# Patient Record
Sex: FEMALE | Employment: STUDENT | URBAN - METROPOLITAN AREA
[De-identification: names, ages, dates, MRNs, and addresses within clinical notes are randomized per-mention and may not be internally consistent; named-entity substitution may affect disease eponyms.]

---

## 2017-01-03 NOTE — TELEPHONE ENCOUNTER
LOV 10/8/16. Per [facility] [place] protocol ok to refill x 6 months.

## 2017-01-09 ENCOUNTER — TELEPHONE (OUTPATIENT)
Dept: ENDOCRINOLOGY CLINIC | Facility: CLINIC | Age: 22
End: 2017-01-09

## 2017-01-09 NOTE — TELEPHONE ENCOUNTER
Pt needs new RX for Contour Easy Meter and also for test strips. Pt only has a few test strips left. Pt needs new meter due to insurance change. Please call when RX is sent to pharmacy.

## 2017-01-09 NOTE — TELEPHONE ENCOUNTER
LOV 10/8/16. Per [facility] [place] protocol ok to send refill for test strips and new meter. Sent to pharmacy and LM letting patient know Rx has been sent.

## 2017-03-17 ENCOUNTER — OFFICE VISIT (OUTPATIENT)
Dept: ENDOCRINOLOGY CLINIC | Facility: CLINIC | Age: 22
End: 2017-03-17

## 2017-03-17 VITALS
WEIGHT: 118 LBS | TEMPERATURE: 99 F | RESPIRATION RATE: 16 BRPM | HEART RATE: 109 BPM | BODY MASS INDEX: 23.16 KG/M2 | HEIGHT: 60 IN | DIASTOLIC BLOOD PRESSURE: 66 MMHG | SYSTOLIC BLOOD PRESSURE: 106 MMHG

## 2017-03-17 DIAGNOSIS — IMO0001 UNCONTROLLED TYPE 1 DIABETES MELLITUS WITHOUT COMPLICATION: Primary | ICD-10-CM

## 2017-03-17 LAB
CARTRIDGE LOT#: ABNORMAL NUMERIC
GLUCOSE BLOOD: 165
HEMOGLOBIN A1C: 7.2 % (ref 4.3–5.6)
TEST STRIP LOT #: NORMAL NUMERIC

## 2017-03-17 PROCEDURE — 99214 OFFICE O/P EST MOD 30 MIN: CPT | Performed by: INTERNAL MEDICINE

## 2017-03-17 PROCEDURE — 36416 COLLJ CAPILLARY BLOOD SPEC: CPT | Performed by: INTERNAL MEDICINE

## 2017-03-17 PROCEDURE — 83036 HEMOGLOBIN GLYCOSYLATED A1C: CPT | Performed by: INTERNAL MEDICINE

## 2017-03-17 PROCEDURE — 82962 GLUCOSE BLOOD TEST: CPT | Performed by: INTERNAL MEDICINE

## 2017-03-17 NOTE — PROGRESS NOTES
Name: Pita Pa  Date: 3/17/2017    Referring Physician: No ref. provider found    HISTORY OF PRESENT ILLNESS   Pita Pa is a 25year old female who presents for diabetes mellitus, diagnosed 8 years ago.   She has tried omnipod in the past Rfl: 3  •  Vitamin D, Ergocalciferol, (DRISDOL) 42657 UNITS Oral Cap, 50,000 Units every 30 (thirty) days.   , Disp: , Rfl: 3  •  LEVEMIR 100 UNIT/ML Subcutaneous Solution, 23 units in the morning and 21 units at night. , Disp: , Rfl: 0  •  APIDRA 100 UNIT/ intact  Psychiatric:  oriented to time, self, and place  Nutritional:  no abnormal weight gain or loss    ASSESSMENT/PLAN:      1.  Diabetes Mellitus Type 1, Uncontrolled  -Uncontrolled, hgA1c 7.2% -->improved  -Discussed importance of glycemic control to p

## 2017-04-10 ENCOUNTER — TELEPHONE (OUTPATIENT)
Dept: ENDOCRINOLOGY CLINIC | Facility: CLINIC | Age: 22
End: 2017-04-10

## 2017-06-23 ENCOUNTER — OFFICE VISIT (OUTPATIENT)
Dept: ENDOCRINOLOGY CLINIC | Facility: CLINIC | Age: 22
End: 2017-06-23

## 2017-06-23 VITALS
HEIGHT: 60 IN | HEART RATE: 99 BPM | SYSTOLIC BLOOD PRESSURE: 104 MMHG | WEIGHT: 118.19 LBS | DIASTOLIC BLOOD PRESSURE: 72 MMHG | BODY MASS INDEX: 23.2 KG/M2

## 2017-06-23 DIAGNOSIS — E10.65 UNCONTROLLED TYPE 1 DIABETES MELLITUS WITH HYPERGLYCEMIA (HCC): Primary | ICD-10-CM

## 2017-06-23 PROCEDURE — 36416 COLLJ CAPILLARY BLOOD SPEC: CPT | Performed by: INTERNAL MEDICINE

## 2017-06-23 PROCEDURE — 99213 OFFICE O/P EST LOW 20 MIN: CPT | Performed by: INTERNAL MEDICINE

## 2017-06-23 PROCEDURE — 83036 HEMOGLOBIN GLYCOSYLATED A1C: CPT | Performed by: INTERNAL MEDICINE

## 2017-06-23 PROCEDURE — 82962 GLUCOSE BLOOD TEST: CPT | Performed by: INTERNAL MEDICINE

## 2017-06-23 RX ORDER — INSULIN ASPART 100 [IU]/ML
INJECTION, SOLUTION INTRAVENOUS; SUBCUTANEOUS
Refills: 12 | COMMUNITY
Start: 2017-06-01 | End: 2018-12-19

## 2017-06-23 NOTE — PROGRESS NOTES
Name: Yuri Beltran  Date: 6/23/2017    Referring Physician: No ref. provider found    HISTORY OF PRESENT ILLNESS   Yuri Beltran is a 23year old female who presents for diabetes mellitus, diagnosed 8 years ago.   She has tried omnipod in the past once daily, Disp: 100 each, Rfl: 3  •  LEVEMIR 100 UNIT/ML Subcutaneous Solution, 21 units in the morning and 21 units at night. , Disp: , Rfl: 0  •  Insulin Syringe 31G X 5/16\" 0.3 ML Does not apply Misc, , Disp: , Rfl: 0  •  Norethindrone-Eth Estradiol strength and tone  Skin:  normal moisture and skin texture  Hair & Nails:  normal scalp hair     Hematologic:  no excessive bruising  Neuro:  sensory grossly intact and motor grossly intact  Psychiatric:  oriented to time, self, and place  Nutritional:  no

## 2017-06-23 NOTE — PATIENT INSTRUCTIONS
Stop Levemir    Start Tresiba 28 units SQ bedtime     Call clinic in 2 weeks with blood glucose levels to adjust dose

## 2017-06-30 ENCOUNTER — TELEPHONE (OUTPATIENT)
Dept: ENDOCRINOLOGY CLINIC | Facility: CLINIC | Age: 22
End: 2017-06-30

## 2017-06-30 NOTE — TELEPHONE ENCOUNTER
We did receive request for refill on pen needles this morning. SH signed and they were sent to [facility] in [facility]. LMTCB on number provided below letting her know.  Informed her she should contact office if unable to  or needs anything bong

## 2017-06-30 NOTE — TELEPHONE ENCOUNTER
Pt called regarding RX: Lov, pt received Pen and would like to know if she should have received needles too? Pls call back at: [phone], thank you.     Current Outpatient Prescriptions:   •  Insulin Pen Needle (BD PEN NEEDLE HAO U/F) 32G X 4 MM Does no

## 2017-06-30 NOTE — TELEPHONE ENCOUNTER
Current Outpatient Prescriptions:  BESOS CONTOUR NEXT TEST In Vitro Strip USE TO CHECK BLOOD SUGAR FIVE TIMES DAILY AS DIRECTED Disp: 150 strip Rfl: 5   NOVOLOG 100 UNIT/ML Subcutaneous Solution Sliding scale Disp:  Rfl: 12   Insulin Degludec (TRESIB

## 2017-06-30 NOTE — TELEPHONE ENCOUNTER
Pt also wanted me to include a 2nd ph number, if she could not be reached at the 1st number. Pls try [phone], ok to leave detailed message, thanks.

## 2017-08-16 ENCOUNTER — TELEPHONE (OUTPATIENT)
Dept: ENDOCRINOLOGY CLINIC | Facility: CLINIC | Age: 22
End: 2017-08-16

## 2017-08-16 NOTE — TELEPHONE ENCOUNTER
Called Riya and let her know below changes. She will contact office if Hypoglycemia continues. She will think about possible CGM but not interested at this time.

## 2017-08-16 NOTE — TELEPHONE ENCOUNTER
Ok, noted. Please change I:CR ratio to 1:8 and decrease Tresiba to 22 units. Is she interested in coming in for CGM to evaluate BG pattern?

## 2017-08-16 NOTE — TELEPHONE ENCOUNTER
Received call from [facility]. She was switched to [place] at [facility] but didn't start it initially. Started it maybe 1-2 weeks ago at advised dose of 29 units nightly.  Was having significant hypoglycemia so she decreased dose to 27 at bedtime and since decreased

## 2017-09-21 RX ORDER — NORETHINDRONE AND ETHINYL ESTRADIOL 1 MG-35MCG
1 KIT ORAL DAILY
Qty: 28 TABLET | Refills: 6 | Status: SHIPPED | OUTPATIENT
Start: 2017-09-21 | End: 2021-08-16

## 2017-11-16 RX ORDER — INSULIN DEGLUDEC INJECTION 100 U/ML
INJECTION, SOLUTION SUBCUTANEOUS
Qty: 15 ML | Refills: 3 | Status: SHIPPED | OUTPATIENT
Start: 2017-11-16 | End: 2018-06-22

## 2017-12-07 ENCOUNTER — TELEPHONE (OUTPATIENT)
Dept: ENDOCRINOLOGY CLINIC | Facility: CLINIC | Age: 22
End: 2017-12-07

## 2017-12-07 DIAGNOSIS — E10.65 UNCONTROLLED TYPE 1 DIABETES MELLITUS WITH COMPLICATION (HCC): Primary | ICD-10-CM

## 2017-12-07 DIAGNOSIS — E10.8 UNCONTROLLED TYPE 1 DIABETES MELLITUS WITH COMPLICATION (HCC): Primary | ICD-10-CM

## 2017-12-07 NOTE — TELEPHONE ENCOUNTER
Orders placed and sent to [facility]. Reminded patient to fast for labs but ok to have juice in the morning.

## 2017-12-07 NOTE — TELEPHONE ENCOUNTER
Patient has follow up scheduled . Last lab orders placed 10/2016 and so are . Would you like labs from 10/8/16 replaced or anything different?

## 2017-12-07 NOTE — TELEPHONE ENCOUNTER
Correct labs from 10/2016 replaced and remind her to fast but ok to have juice in the morning. Thanks.

## 2017-12-07 NOTE — TELEPHONE ENCOUNTER
Pt asking for labs to be faxed to [facility] in [address] - fax - [phone]- pls call when sent

## 2017-12-11 ENCOUNTER — OFFICE VISIT (OUTPATIENT)
Dept: ENDOCRINOLOGY CLINIC | Facility: CLINIC | Age: 22
End: 2017-12-11

## 2017-12-11 VITALS
BODY MASS INDEX: 24.35 KG/M2 | DIASTOLIC BLOOD PRESSURE: 66 MMHG | HEIGHT: 60 IN | SYSTOLIC BLOOD PRESSURE: 114 MMHG | WEIGHT: 124 LBS

## 2017-12-11 DIAGNOSIS — E10.65 UNCONTROLLED TYPE 1 DIABETES MELLITUS WITH HYPERGLYCEMIA (HCC): Primary | ICD-10-CM

## 2017-12-11 PROCEDURE — 83036 HEMOGLOBIN GLYCOSYLATED A1C: CPT | Performed by: INTERNAL MEDICINE

## 2017-12-11 PROCEDURE — 82962 GLUCOSE BLOOD TEST: CPT | Performed by: INTERNAL MEDICINE

## 2017-12-11 PROCEDURE — 36416 COLLJ CAPILLARY BLOOD SPEC: CPT | Performed by: INTERNAL MEDICINE

## 2017-12-11 PROCEDURE — 99212 OFFICE O/P EST SF 10 MIN: CPT | Performed by: INTERNAL MEDICINE

## 2017-12-11 PROCEDURE — 99214 OFFICE O/P EST MOD 30 MIN: CPT | Performed by: INTERNAL MEDICINE

## 2017-12-11 NOTE — PROGRESS NOTES
Name: Steve Ritchie  Date: 12/11/2017    Referring Physician: No ref. provider found    HISTORY OF PRESENT ILLNESS   Steve Ritchie is a 23year old female who presents for diabetes mellitus, diagnosed 8 years ago.   She has tried omnipod in the past insulin injections daily as directed, Disp: 100 each, Rfl: 5  •  NOVOLOG 100 UNIT/ML Subcutaneous Solution, Sliding scale, Disp: , Rfl: 12  •  Blood Glucose Monitoring Suppl (Clarassance CONTOUR MONITOR) W/DEVICE Does not apply Kit, Use to check blood sugar as d S4  Gastrointestinal:  normal bowel sounds and no palpable masses in abdomen, organomegaly or tenderness   Musculoskeletal:  normal muscle strength and tone  Skin:  normal moisture and skin texture  Hair & Nails:  normal scalp hair     Hematologic:  no exc

## 2018-03-16 ENCOUNTER — OFFICE VISIT (OUTPATIENT)
Dept: ENDOCRINOLOGY CLINIC | Facility: CLINIC | Age: 23
End: 2018-03-16

## 2018-03-16 VITALS
SYSTOLIC BLOOD PRESSURE: 115 MMHG | HEIGHT: 60 IN | WEIGHT: 124 LBS | HEART RATE: 94 BPM | DIASTOLIC BLOOD PRESSURE: 76 MMHG | BODY MASS INDEX: 24.35 KG/M2

## 2018-03-16 DIAGNOSIS — E10.65 UNCONTROLLED TYPE 1 DIABETES MELLITUS WITH HYPERGLYCEMIA (HCC): Primary | ICD-10-CM

## 2018-03-16 LAB
CARTRIDGE LOT#: ABNORMAL NUMERIC
GLUCOSE BLOOD: 187
HEMOGLOBIN A1C: 6.9 % (ref 4.3–5.6)
TEST STRIP LOT #: NORMAL NUMERIC

## 2018-03-16 PROCEDURE — 82962 GLUCOSE BLOOD TEST: CPT | Performed by: INTERNAL MEDICINE

## 2018-03-16 PROCEDURE — 99212 OFFICE O/P EST SF 10 MIN: CPT | Performed by: INTERNAL MEDICINE

## 2018-03-16 PROCEDURE — 83036 HEMOGLOBIN GLYCOSYLATED A1C: CPT | Performed by: INTERNAL MEDICINE

## 2018-03-16 PROCEDURE — 99214 OFFICE O/P EST MOD 30 MIN: CPT | Performed by: INTERNAL MEDICINE

## 2018-03-16 PROCEDURE — 36416 COLLJ CAPILLARY BLOOD SPEC: CPT | Performed by: INTERNAL MEDICINE

## 2018-03-16 NOTE — PROGRESS NOTES
Name: Yesenia Cevallos  Date: 3/16/2018    Referring Physician: No ref. provider found    HISTORY OF PRESENT ILLNESS   Yesenia Cevallos is a 23year old female who presents for diabetes mellitus, diagnosed 8 years ago.   She has tried omnipod in the past MG-MCG Oral Tab, TAKE 1 TABLET BY MOUTH DAILY, Disp: 28 tablet, Rfl: 6  •  NOVOLOG 100 UNIT/ML Subcutaneous Solution, Sliding scale, Disp: , Rfl: 12  •  Blood Glucose Monitoring Suppl ([employer] CONTOUR MONITOR) W/DEVICE Does not apply Kit, Use to check blood Musculoskeletal:  normal muscle strength and tone  Skin:  normal moisture and skin texture  Hair & Nails:  normal scalp hair     Hematologic:  no excessive bruising  Neuro:  sensory grossly intact and motor grossly intact  Psychiatric:  oriented to time,

## 2018-03-16 NOTE — PATIENT INSTRUCTIONS
Continue Tresiba 22 units SQ daily    Change Insulin to Carb ratio   1:8 for breakfast; 1:7 for lunch and dinner

## 2018-04-18 ENCOUNTER — TELEPHONE (OUTPATIENT)
Dept: ENDOCRINOLOGY CLINIC | Facility: CLINIC | Age: 23
End: 2018-04-18

## 2018-04-18 NOTE — TELEPHONE ENCOUNTER
Pt calling to advise a fax was sent to the office for a form for her drivers license renewal, pt forgot to include fax#[phone] to send back, any questions call pt at:[phone],thanks.

## 2018-04-20 NOTE — TELEPHONE ENCOUNTER
Form received and will give to provider to complete then fax back to number below. SH will return to clinic on Monday.

## 2018-04-24 RX ORDER — NORETHINDRONE AND ETHINYL ESTRADIOL 1 MG-35MCG
1 KIT ORAL DAILY
Qty: 28 TABLET | Refills: 0 | Status: SHIPPED | OUTPATIENT
Start: 2018-04-24 | End: 2021-08-16

## 2018-05-24 RX ORDER — NORETHINDRONE AND ETHINYL ESTRADIOL 1 MG-35MCG
1 KIT ORAL DAILY
Qty: 28 TABLET | Refills: 5 | Status: SHIPPED | OUTPATIENT
Start: 2018-05-24 | End: 2021-08-16

## 2018-06-22 RX ORDER — INSULIN DEGLUDEC INJECTION 100 U/ML
INJECTION, SOLUTION SUBCUTANEOUS
Qty: 15 ML | Refills: 3 | Status: SHIPPED | OUTPATIENT
Start: 2018-06-22 | End: 2019-02-07

## 2018-06-22 NOTE — TELEPHONE ENCOUNTER
Rx request for Chloe Kay, please review. Thank you.     LOV: 3/16/18 RTC 3 months  Last Refill: 11/16/17

## 2018-08-17 RX ORDER — BLOOD SUGAR DIAGNOSTIC
STRIP MISCELLANEOUS
Qty: 500 STRIP | Refills: 0 | Status: SHIPPED | OUTPATIENT
Start: 2018-08-17 | End: 2018-11-12

## 2018-09-14 ENCOUNTER — OFFICE VISIT (OUTPATIENT)
Dept: ENDOCRINOLOGY CLINIC | Facility: CLINIC | Age: 23
End: 2018-09-14
Payer: COMMERCIAL

## 2018-09-14 VITALS
WEIGHT: 125 LBS | SYSTOLIC BLOOD PRESSURE: 122 MMHG | DIASTOLIC BLOOD PRESSURE: 76 MMHG | BODY MASS INDEX: 24 KG/M2 | HEART RATE: 95 BPM

## 2018-09-14 DIAGNOSIS — E10.9 CONTROLLED DIABETES MELLITUS TYPE 1 WITHOUT COMPLICATIONS (HCC): Primary | ICD-10-CM

## 2018-09-14 LAB
CARTRIDGE LOT#: ABNORMAL NUMERIC
GLUCOSE BLOOD: 159
HEMOGLOBIN A1C: 6.9 % (ref 4.3–5.6)
TEST STRIP LOT #: NORMAL NUMERIC

## 2018-09-14 PROCEDURE — 36416 COLLJ CAPILLARY BLOOD SPEC: CPT | Performed by: INTERNAL MEDICINE

## 2018-09-14 PROCEDURE — 99214 OFFICE O/P EST MOD 30 MIN: CPT | Performed by: INTERNAL MEDICINE

## 2018-09-14 PROCEDURE — 99212 OFFICE O/P EST SF 10 MIN: CPT | Performed by: INTERNAL MEDICINE

## 2018-09-14 PROCEDURE — 82962 GLUCOSE BLOOD TEST: CPT | Performed by: INTERNAL MEDICINE

## 2018-09-14 PROCEDURE — 83036 HEMOGLOBIN GLYCOSYLATED A1C: CPT | Performed by: INTERNAL MEDICINE

## 2018-09-14 NOTE — PROGRESS NOTES
Name: Dre Bennett  Date: 9/14/2018    Referring Physician: No ref. provider found    HISTORY OF PRESENT ILLNESS   Dre Bennett is a 26year old female who presents for diabetes mellitus, diagnosed 8 years ago.   She has tried omnipod in the past Disp: 100 each, Rfl: 5  •  NORTREL 1/35, 28, 1-35 MG-MCG Oral Tab, TAKE 1 TABLET BY MOUTH DAILY, Disp: 28 tablet, Rfl: 6  •  NOVOLOG 100 UNIT/ML Subcutaneous Solution, Sliding scale, Disp: , Rfl: 12  •  Blood Glucose Monitoring Suppl ([employer] CONTOUR MONITOR conjunctivae, sclera. , normal sclera and normal pupils  Ears/Nose/Mouth/Throat/Neck:  no palpable thyroid nodules or cervical lymphadenopathy  Back: no kyphosis or back tenderness  Respiratory:  clear to auscultation bilaterally  Cardiovascular:  regular r

## 2018-10-31 RX ORDER — NORETHINDRONE AND ETHINYL ESTRADIOL 1 MG-35MCG
KIT ORAL
Qty: 28 TABLET | Refills: 5 | Status: SHIPPED | OUTPATIENT
Start: 2018-10-31 | End: 2021-08-16

## 2018-11-13 RX ORDER — BLOOD SUGAR DIAGNOSTIC
STRIP MISCELLANEOUS
Qty: 500 STRIP | Refills: 0 | Status: SHIPPED | OUTPATIENT
Start: 2018-11-13 | End: 2019-02-03

## 2018-12-19 ENCOUNTER — TELEPHONE (OUTPATIENT)
Dept: ENDOCRINOLOGY CLINIC | Facility: CLINIC | Age: 23
End: 2018-12-19

## 2018-12-19 RX ORDER — INSULIN ASPART 100 [IU]/ML
INJECTION, SOLUTION INTRAVENOUS; SUBCUTANEOUS
Qty: 1 VIAL | Refills: 3 | Status: SHIPPED | OUTPATIENT
Start: 2018-12-19 | End: 2018-12-20

## 2018-12-19 NOTE — TELEPHONE ENCOUNTER
Pt requesting script for refill rx:Novolog, pt confirmed default pharm, pls call at:  [phone],[facility].   *Pt has appt 2/22/19    Current Outpatient Medications:   •  NOVOLOG 100 UNIT/ML Subcutaneous Solution, Sliding scale, Disp: , Rfl: 12

## 2018-12-20 RX ORDER — INSULIN ASPART 100 [IU]/ML
INJECTION, SOLUTION INTRAVENOUS; SUBCUTANEOUS
Qty: 1 VIAL | Refills: 5 | Status: SHIPPED | OUTPATIENT
Start: 2018-12-20 | End: 2018-12-21

## 2018-12-20 NOTE — TELEPHONE ENCOUNTER
Pt requesting RN to refax rx - states there are no instructions and pharm cannot fill. Pls call. Thank you.

## 2018-12-21 RX ORDER — INSULIN ASPART 100 [IU]/ML
INJECTION, SOLUTION INTRAVENOUS; SUBCUTANEOUS
Qty: 3 VIAL | Refills: 5 | Status: SHIPPED | OUTPATIENT
Start: 2018-12-21 | End: 2019-08-01

## 2018-12-21 NOTE — TELEPHONE ENCOUNTER
Spoke with pharmacist. Max dose needed. 50 units per day max dose added to script so it could be filled. LMTCB with patient to clarify this amt.

## 2018-12-21 NOTE — TELEPHONE ENCOUNTER
Patient returned call and states previous vial prescription read max 100 units daily. Prescription updated.

## 2019-02-04 RX ORDER — BLOOD SUGAR DIAGNOSTIC
STRIP MISCELLANEOUS
Qty: 500 STRIP | Refills: 0 | Status: SHIPPED | OUTPATIENT
Start: 2019-02-04 | End: 2019-05-13

## 2019-02-07 DIAGNOSIS — E10.9 CONTROLLED DIABETES MELLITUS TYPE 1 WITHOUT COMPLICATIONS (HCC): Primary | ICD-10-CM

## 2019-02-08 RX ORDER — INSULIN DEGLUDEC INJECTION 100 U/ML
INJECTION, SOLUTION SUBCUTANEOUS
Qty: 15 ML | Refills: 5 | Status: SHIPPED | OUTPATIENT
Start: 2019-02-08 | End: 2020-06-25

## 2019-04-25 ENCOUNTER — TELEPHONE (OUTPATIENT)
Dept: ENDOCRINOLOGY CLINIC | Facility: CLINIC | Age: 24
End: 2019-04-25

## 2019-04-25 DIAGNOSIS — E03.9 HYPOTHYROIDISM, UNSPECIFIED TYPE: ICD-10-CM

## 2019-04-25 DIAGNOSIS — E10.65 UNCONTROLLED TYPE 1 DIABETES MELLITUS WITH HYPERGLYCEMIA (HCC): Primary | ICD-10-CM

## 2019-04-25 RX ORDER — NORETHINDRONE AND ETHINYL ESTRADIOL 1 MG-35MCG
KIT ORAL
Qty: 28 TABLET | Refills: 0 | Status: SHIPPED | OUTPATIENT
Start: 2019-04-25 | End: 2021-08-16

## 2019-04-25 NOTE — TELEPHONE ENCOUNTER
Pt calling for orders to be faxed to [facility] in [facility] Fax [phone] states she misplaced her orders please call pt .  Pt states she is a diabetic and has not eaten

## 2019-04-26 ENCOUNTER — OFFICE VISIT (OUTPATIENT)
Dept: ENDOCRINOLOGY CLINIC | Facility: CLINIC | Age: 24
End: 2019-04-26
Payer: COMMERCIAL

## 2019-04-26 VITALS
BODY MASS INDEX: 24 KG/M2 | HEART RATE: 106 BPM | WEIGHT: 125 LBS | SYSTOLIC BLOOD PRESSURE: 129 MMHG | DIASTOLIC BLOOD PRESSURE: 77 MMHG

## 2019-04-26 DIAGNOSIS — E10.9 CONTROLLED DIABETES MELLITUS TYPE 1 WITHOUT COMPLICATIONS (HCC): Primary | ICD-10-CM

## 2019-04-26 PROCEDURE — 82962 GLUCOSE BLOOD TEST: CPT | Performed by: INTERNAL MEDICINE

## 2019-04-26 PROCEDURE — 83036 HEMOGLOBIN GLYCOSYLATED A1C: CPT | Performed by: INTERNAL MEDICINE

## 2019-04-26 PROCEDURE — 36416 COLLJ CAPILLARY BLOOD SPEC: CPT | Performed by: INTERNAL MEDICINE

## 2019-04-26 PROCEDURE — 99214 OFFICE O/P EST MOD 30 MIN: CPT | Performed by: INTERNAL MEDICINE

## 2019-04-26 NOTE — PROGRESS NOTES
Name: Federico Kahn  Date: 4/26/2019    Referring Physician: No ref. provider found    HISTORY OF PRESENT ILLNESS   Federico Kahn is a 27year old female who presents for diabetes mellitus, diagnosed 8 years ago.   She has tried omnipod in the past breakfast. Max dose 100 units daily, Disp: 3 vial, Rfl: 5  •  NORTREL 1/35, 28, 1-35 MG-MCG Oral Tab, TAKE 1 TABLET BY MOUTH DAILY, Disp: 28 tablet, Rfl: 5  •  NORTREL 1/35, 28, 1-35 MG-MCG Oral Tab, TAKE 1 TABLET BY MOUTH DAILY, Disp: 28 tablet, Rfl: 5  • normal pupils  Ears/Nose/Mouth/Throat/Neck:  no palpable thyroid nodules or cervical lymphadenopathy  Back: no kyphosis or back tenderness  Respiratory:  clear to auscultation bilaterally  Cardiovascular:  regular rate, rhythm, , no murmurs, S3 or S4  Christopher

## 2019-05-13 RX ORDER — BLOOD SUGAR DIAGNOSTIC
STRIP MISCELLANEOUS
Qty: 500 STRIP | Refills: 0 | Status: SHIPPED | OUTPATIENT
Start: 2019-05-13 | End: 2019-08-15

## 2019-06-03 RX ORDER — NORETHINDRONE AND ETHINYL ESTRADIOL 1 MG-35MCG
KIT ORAL
Qty: 28 TABLET | Refills: 3 | Status: SHIPPED | OUTPATIENT
Start: 2019-06-03 | End: 2020-06-16

## 2019-06-10 NOTE — TELEPHONE ENCOUNTER
Last filled: 4/8/16 #100 each with 3 refills  LOV: 4/26/19  Future Appointments   Date Time Provider Arianna Fish   10/12/2019 10:15 AM [facility]     RTC in 6 mo. Please advise.

## 2019-08-01 RX ORDER — INSULIN ASPART 100 [IU]/ML
INJECTION, SOLUTION INTRAVENOUS; SUBCUTANEOUS
Qty: 30 ML | Refills: 0 | Status: SHIPPED | OUTPATIENT
Start: 2019-08-01 | End: 2019-09-17

## 2019-08-15 RX ORDER — BLOOD SUGAR DIAGNOSTIC
STRIP MISCELLANEOUS
Qty: 500 STRIP | Refills: 1 | Status: SHIPPED | OUTPATIENT
Start: 2019-08-15 | End: 2020-03-12

## 2019-09-17 RX ORDER — INSULIN ASPART 100 [IU]/ML
INJECTION, SOLUTION INTRAVENOUS; SUBCUTANEOUS
Qty: 30 ML | Refills: 0 | Status: SHIPPED | OUTPATIENT
Start: 2019-09-17 | End: 2020-04-27

## 2020-01-16 ENCOUNTER — TELEPHONE (OUTPATIENT)
Dept: ENDOCRINOLOGY CLINIC | Facility: CLINIC | Age: 25
End: 2020-01-16

## 2020-02-28 ENCOUNTER — APPOINTMENT (OUTPATIENT)
Dept: LAB | Facility: HOSPITAL | Age: 25
End: 2020-02-28
Attending: INTERNAL MEDICINE
Payer: COMMERCIAL

## 2020-02-28 ENCOUNTER — OFFICE VISIT (OUTPATIENT)
Dept: ENDOCRINOLOGY CLINIC | Facility: CLINIC | Age: 25
End: 2020-02-28
Payer: COMMERCIAL

## 2020-02-28 ENCOUNTER — TELEPHONE (OUTPATIENT)
Dept: ENDOCRINOLOGY CLINIC | Facility: CLINIC | Age: 25
End: 2020-02-28

## 2020-02-28 VITALS
HEART RATE: 119 BPM | WEIGHT: 123 LBS | SYSTOLIC BLOOD PRESSURE: 130 MMHG | BODY MASS INDEX: 24 KG/M2 | DIASTOLIC BLOOD PRESSURE: 84 MMHG

## 2020-02-28 DIAGNOSIS — E10.9 CONTROLLED DIABETES MELLITUS TYPE 1 WITHOUT COMPLICATIONS (HCC): Primary | ICD-10-CM

## 2020-02-28 LAB
ALBUMIN SERPL-MCNC: 3.8 G/DL (ref 3.4–5)
ALBUMIN/GLOB SERPL: 0.9 {RATIO} (ref 1–2)
ALP LIVER SERPL-CCNC: 63 U/L (ref 52–144)
ALT SERPL-CCNC: 17 U/L (ref 13–56)
ANION GAP SERPL CALC-SCNC: 6 MMOL/L (ref 0–18)
AST SERPL-CCNC: 7 U/L (ref 15–37)
BILIRUB SERPL-MCNC: 0.4 MG/DL (ref 0.1–2)
BUN BLD-MCNC: 16 MG/DL (ref 7–18)
BUN/CREAT SERPL: 22.2 (ref 10–20)
CALCIUM BLD-MCNC: 9.2 MG/DL (ref 8.5–10.1)
CARTRIDGE LOT#: ABNORMAL NUMERIC
CHLORIDE SERPL-SCNC: 107 MMOL/L (ref 98–112)
CHOLEST SMN-MCNC: 120 MG/DL (ref ?–200)
CO2 SERPL-SCNC: 24 MMOL/L (ref 21–32)
CREAT BLD-MCNC: 0.72 MG/DL (ref 0.55–1.02)
CREAT UR-SCNC: 93.3 MG/DL
GLOBULIN PLAS-MCNC: 4.2 G/DL (ref 2.8–4.4)
GLUCOSE BLD-MCNC: 213 MG/DL (ref 70–99)
GLUCOSE BLOOD: 174
HDLC SERPL-MCNC: 61 MG/DL (ref 40–59)
HEMOGLOBIN A1C: 7.2 % (ref 4.3–5.6)
LDLC SERPL CALC-MCNC: 51 MG/DL (ref ?–100)
M PROTEIN MFR SERPL ELPH: 8 G/DL (ref 6.4–8.2)
MICROALBUMIN UR-MCNC: 0.92 MG/DL
MICROALBUMIN/CREAT 24H UR-RTO: 9.9 UG/MG (ref ?–30)
NONHDLC SERPL-MCNC: 59 MG/DL (ref ?–130)
OSMOLALITY SERPL CALC.SUM OF ELEC: 292 MOSM/KG (ref 275–295)
PATIENT FASTING Y/N/NP: YES
PATIENT FASTING Y/N/NP: YES
POTASSIUM SERPL-SCNC: 3.8 MMOL/L (ref 3.5–5.1)
SODIUM SERPL-SCNC: 137 MMOL/L (ref 136–145)
T4 FREE SERPL-MCNC: 1 NG/DL (ref 0.8–1.7)
TEST STRIP LOT #: NORMAL NUMERIC
TRIGL SERPL-MCNC: 38 MG/DL (ref 30–149)
TSI SER-ACNC: 2.62 MIU/ML (ref 0.36–3.74)
VLDLC SERPL CALC-MCNC: 8 MG/DL (ref 0–30)

## 2020-02-28 PROCEDURE — 80053 COMPREHEN METABOLIC PANEL: CPT | Performed by: INTERNAL MEDICINE

## 2020-02-28 PROCEDURE — 99214 OFFICE O/P EST MOD 30 MIN: CPT | Performed by: INTERNAL MEDICINE

## 2020-02-28 PROCEDURE — 84439 ASSAY OF FREE THYROXINE: CPT | Performed by: INTERNAL MEDICINE

## 2020-02-28 PROCEDURE — 82570 ASSAY OF URINE CREATININE: CPT | Performed by: INTERNAL MEDICINE

## 2020-02-28 PROCEDURE — 80061 LIPID PANEL: CPT | Performed by: INTERNAL MEDICINE

## 2020-02-28 PROCEDURE — 84443 ASSAY THYROID STIM HORMONE: CPT | Performed by: INTERNAL MEDICINE

## 2020-02-28 PROCEDURE — 83036 HEMOGLOBIN GLYCOSYLATED A1C: CPT | Performed by: INTERNAL MEDICINE

## 2020-02-28 PROCEDURE — 36415 COLL VENOUS BLD VENIPUNCTURE: CPT | Performed by: INTERNAL MEDICINE

## 2020-02-28 PROCEDURE — 82962 GLUCOSE BLOOD TEST: CPT | Performed by: INTERNAL MEDICINE

## 2020-02-28 PROCEDURE — 82043 UR ALBUMIN QUANTITATIVE: CPT | Performed by: INTERNAL MEDICINE

## 2020-02-28 PROCEDURE — 36416 COLLJ CAPILLARY BLOOD SPEC: CPT | Performed by: INTERNAL MEDICINE

## 2020-02-28 RX ORDER — ONDANSETRON HYDROCHLORIDE 8 MG/1
8 TABLET, FILM COATED ORAL EVERY 8 HOURS PRN
Qty: 10 TABLET | Refills: 0 | Status: SHIPPED | OUTPATIENT
Start: 2020-02-28

## 2020-02-28 NOTE — PROGRESS NOTES
Name: Julio Ziegler  Date: 2/28/2020    Referring Physician: No ref. provider found    HISTORY OF PRESENT ILLNESS   Julio Ziegler is a 27year old female who presents for diabetes mellitus, diagnosed 8 years ago.   She has tried omnipod in the past but didn Tab, TAKE 1 TABLET BY MOUTH DAILY, Disp: 28 tablet, Rfl: 0  •  TRESIBA FLEXTOUCH 100 UNIT/ML Subcutaneous Solution Pen-injector, INJECT 28 UNITS UNDER THE SKIN DAILY (Patient taking differently: INJECT 18 UNITS UNDER THE SKIN DAILY), Disp: 15 mL, Rfl: 5  • distress  Eyes:  normal conjunctivae, sclera. , normal sclera and normal pupils  Ears/Nose/Mouth/Throat/Neck:  no palpable thyroid nodules or cervical lymphadenopathy  Back: no kyphosis or back tenderness  Respiratory:  clear to auscultation bilaterally  Ca

## 2020-02-28 NOTE — TELEPHONE ENCOUNTER
Sylvain requesting to speak with RN regarding Injection Device rx - not sure what that is. Please call. Thank you.

## 2020-03-02 NOTE — TELEPHONE ENCOUNTER
Called pharmacy and pharmacist states the insurance doesn't cover the [address] . Insurance will cover the GVoke.(injectable). Please advise if you want to order.

## 2020-03-03 ENCOUNTER — TELEPHONE (OUTPATIENT)
Dept: PULMONOLOGY | Facility: CLINIC | Age: 25
End: 2020-03-03

## 2020-03-03 NOTE — TELEPHONE ENCOUNTER
Patient states pharm has not received Inpen rx. Please call. Thank you.     Current Outpatient Medications   Medication Sig Dispense Refill   • Injection Device for Insulin (INPEN 100-GREY-TARYN) Does not apply Device Inject 3 times daily 1 Device 0

## 2020-03-03 NOTE — TELEPHONE ENCOUNTER
Medication PA Requested: Baqsimi 3mg (one pack)                                                   CoverMyMeds Used: No  Key:  Sig: 3mg by nasal as needed  DX Code:  E10.9                                   CPT code (if applicable):   Case Number/Pending Ref
PRIOR AUTH FOR:      •  Glucagon (BAQSIMI ONE PACK) 3 MG/DOSE Nasal Powder, 3 mg by Nasal route as needed. , Disp: 1 each, Rfl: 1    MESSAGE:   PLAN DOES NOT COVER THIS MEDICATION.  PLEASE CALL PLAN [phone] TO INITIATE PRIOR AUTHORIZATION OR CALL/FAX
Please cancel the request below. Dr. Katalina Paniagua changed medication to [facility] as pharmacy states this will be covered. Thank you.
Spoke with insurance plan to ask for alternative but per [facility], it is not showing any alternative.
noted
Detail Level: Zone
Detail Level: Simple

## 2020-03-04 NOTE — TELEPHONE ENCOUNTER
Called pharmacy who claim did not received our order for the inpen device. Clarified order with pharmacy and called patient and left message device will be ready for  today.

## 2020-03-12 RX ORDER — BLOOD SUGAR DIAGNOSTIC
STRIP MISCELLANEOUS
Qty: 500 STRIP | Refills: 1 | Status: SHIPPED | OUTPATIENT
Start: 2020-03-12 | End: 2021-01-11

## 2020-04-27 RX ORDER — INSULIN ASPART 100 [IU]/ML
INJECTION, SOLUTION INTRAVENOUS; SUBCUTANEOUS
Qty: 30 ML | Refills: 0 | Status: SHIPPED | OUTPATIENT
Start: 2020-04-27

## 2020-05-27 NOTE — TELEPHONE ENCOUNTER
Current Outpatient Medications:   •  NOVOLOG 100 UNIT/ML Subcutaneous Solution, USE MAX DOSE OF 100UNITS DAILY AS DIRECTED., Disp: 30 mL, Rfl: 0

## 2020-06-16 RX ORDER — NORETHINDRONE AND ETHINYL ESTRADIOL 1 MG-35MCG
KIT ORAL
Qty: 28 TABLET | Refills: 3 | Status: SHIPPED | OUTPATIENT
Start: 2020-06-16 | End: 2021-06-07

## 2020-06-25 NOTE — TELEPHONE ENCOUNTER
•  TRESIBA FLEXTOUCH 100 UNIT/ML Subcutaneous Solution Pen-injector, INJECT 28 UNITS UNDER THE SKIN DAILY (Patient taking differently: INJECT 18 UNITS UNDER THE SKIN DAILY), Disp: 15 mL, Rfl: 5

## 2020-08-28 ENCOUNTER — OFFICE VISIT (OUTPATIENT)
Dept: ENDOCRINOLOGY CLINIC | Facility: CLINIC | Age: 25
End: 2020-08-28
Payer: COMMERCIAL

## 2020-08-28 VITALS
WEIGHT: 127.19 LBS | BODY MASS INDEX: 25 KG/M2 | SYSTOLIC BLOOD PRESSURE: 137 MMHG | DIASTOLIC BLOOD PRESSURE: 80 MMHG | HEART RATE: 97 BPM

## 2020-08-28 DIAGNOSIS — E10.9 CONTROLLED DIABETES MELLITUS TYPE 1 WITHOUT COMPLICATIONS (HCC): Primary | ICD-10-CM

## 2020-08-28 LAB
CARTRIDGE LOT#: ABNORMAL NUMERIC
GLUCOSE BLOOD: 197
HEMOGLOBIN A1C: 8 % (ref 4.3–5.6)
TEST STRIP LOT #: NORMAL NUMERIC

## 2020-08-28 PROCEDURE — 83036 HEMOGLOBIN GLYCOSYLATED A1C: CPT | Performed by: INTERNAL MEDICINE

## 2020-08-28 PROCEDURE — 3079F DIAST BP 80-89 MM HG: CPT | Performed by: INTERNAL MEDICINE

## 2020-08-28 PROCEDURE — 3075F SYST BP GE 130 - 139MM HG: CPT | Performed by: INTERNAL MEDICINE

## 2020-08-28 PROCEDURE — 36416 COLLJ CAPILLARY BLOOD SPEC: CPT | Performed by: INTERNAL MEDICINE

## 2020-08-28 PROCEDURE — 82962 GLUCOSE BLOOD TEST: CPT | Performed by: INTERNAL MEDICINE

## 2020-08-28 PROCEDURE — 99214 OFFICE O/P EST MOD 30 MIN: CPT | Performed by: INTERNAL MEDICINE

## 2020-08-28 NOTE — PATIENT INSTRUCTIONS
Increase Tresiba to 20 units SQ bedtime for 3 days    IF morning sugars still above 150 then increase to 22 units SQ bedtime for one week    IF morning sugars still above 150 then increase to 24 units SQ bedtime

## 2020-08-28 NOTE — PROGRESS NOTES
Name: Issa Briseno  Date: 8/28/2020    Referring Physician: No ref. provider found    HISTORY OF PRESENT ILLNESS   Issa Briseno is a 23year old female who presents for diabetes mellitus, diagnosed 8 years ago.   She has tried omnipod in the past but didn Pen Needle (BD PEN NEEDLE HAO U/F) 32G X 4 MM Does not apply Misc, USE WITH INSULIN INJECTIONS DAILY AS DIRECTED, Disp: 100 each, Rfl: 3  •  CONTOUR NEXT TEST In Vitro Strip, TEST 5 TIMES DAILY AS DIRECTED., Disp: 500 strip, Rfl: 1  •  Ondansetron HCl (ZO 0.0 standard drinks      Drug use: No      Medical History:   Past Medical History:   Diagnosis Date   • Type 1 diabetes mellitus ([facility] [address]        Surgical history:   History reviewed. No pertinent surgical history.       PHYSICAL EXAM  /80   Pulse 97   W

## 2021-01-04 ENCOUNTER — OFFICE VISIT (OUTPATIENT)
Dept: ENDOCRINOLOGY CLINIC | Facility: CLINIC | Age: 26
End: 2021-01-04
Payer: COMMERCIAL

## 2021-01-04 VITALS
SYSTOLIC BLOOD PRESSURE: 120 MMHG | DIASTOLIC BLOOD PRESSURE: 78 MMHG | WEIGHT: 129 LBS | BODY MASS INDEX: 25 KG/M2 | HEART RATE: 101 BPM

## 2021-01-04 DIAGNOSIS — E10.9 CONTROLLED DIABETES MELLITUS TYPE 1 WITHOUT COMPLICATIONS (HCC): Primary | ICD-10-CM

## 2021-01-04 LAB
GLUCOSE BLOOD: 258
HEMOGLOBIN A1C: 6.7 % (ref 4.3–5.6)
TEST STRIP EXPIRATION DATE: NORMAL DATE
TEST STRIP LOT #: NORMAL NUMERIC

## 2021-01-04 PROCEDURE — 83036 HEMOGLOBIN GLYCOSYLATED A1C: CPT | Performed by: INTERNAL MEDICINE

## 2021-01-04 PROCEDURE — 99214 OFFICE O/P EST MOD 30 MIN: CPT | Performed by: INTERNAL MEDICINE

## 2021-01-04 PROCEDURE — 36416 COLLJ CAPILLARY BLOOD SPEC: CPT | Performed by: INTERNAL MEDICINE

## 2021-01-04 PROCEDURE — 3078F DIAST BP <80 MM HG: CPT | Performed by: INTERNAL MEDICINE

## 2021-01-04 PROCEDURE — 82947 ASSAY GLUCOSE BLOOD QUANT: CPT | Performed by: INTERNAL MEDICINE

## 2021-01-04 PROCEDURE — 3074F SYST BP LT 130 MM HG: CPT | Performed by: INTERNAL MEDICINE

## 2021-01-04 NOTE — PROGRESS NOTES
Name: Antony Al  Date: 1/4/2021    Referring Physician: No ref. provider found    HISTORY OF PRESENT ILLNESS   Antony Al is a 23year old female who presents for diabetes mellitus, diagnosed 8 years ago.   She has tried omnipod in the past but didn' HAO U/F) 32G X 4 MM Does not apply Misc, USE WITH INSULIN INJECTIONS DAILY AS DIRECTED, Disp: 100 each, Rfl: 3  •  CONTOUR NEXT TEST In Vitro Strip, TEST 5 TIMES DAILY AS DIRECTED., Disp: 500 strip, Rfl: 1  •  Ondansetron HCl (ZOFRAN) 8 MG tablet, Take 1 Drug use: No      Medical History:   Past Medical History:   Diagnosis Date   • Type 1 diabetes mellitus ([facility] [address]        Surgical history:   History reviewed. No pertinent surgical history.       PHYSICAL EXAM  /78   Pulse 101   Wt 129 lb (58.5 kg)   BMI

## 2021-01-11 RX ORDER — BLOOD SUGAR DIAGNOSTIC
STRIP MISCELLANEOUS
Qty: 500 STRIP | Refills: 0 | Status: SHIPPED | OUTPATIENT
Start: 2021-01-11 | End: 2021-04-19

## 2021-01-14 RX ORDER — PEN NEEDLE, DIABETIC 32GX 5/32"
NEEDLE, DISPOSABLE MISCELLANEOUS
Qty: 100 EACH | Refills: 3 | Status: SHIPPED | OUTPATIENT
Start: 2021-01-14

## 2021-01-18 ENCOUNTER — TELEPHONE (OUTPATIENT)
Dept: ENDOCRINOLOGY CLINIC | Facility: CLINIC | Age: 26
End: 2021-01-18

## 2021-04-19 RX ORDER — BLOOD SUGAR DIAGNOSTIC
STRIP MISCELLANEOUS
Qty: 500 STRIP | Refills: 0 | Status: SHIPPED | OUTPATIENT
Start: 2021-04-19 | End: 2021-07-19

## 2021-06-07 RX ORDER — NORETHINDRONE AND ETHINYL ESTRADIOL 1 MG-35MCG
KIT ORAL
Qty: 28 TABLET | Refills: 3 | Status: SHIPPED | OUTPATIENT
Start: 2021-06-07 | End: 2021-08-16

## 2021-07-12 ENCOUNTER — TELEPHONE (OUTPATIENT)
Dept: ENDOCRINOLOGY CLINIC | Facility: CLINIC | Age: 26
End: 2021-07-12

## 2021-07-12 NOTE — TELEPHONE ENCOUNTER
Father inquiring how critical it is to get the covid 19 vaccine based on pt being diabetic.  Please call [phone]

## 2021-07-14 NOTE — TELEPHONE ENCOUNTER
Called and spoke to patient, and advised her to due being immunocompromised it is highly recommended that she does receive the vaccine. Patient stated she wants to discus more with Dr. Tesfaye Mcelroy at her upcoming appointment.

## 2021-07-19 RX ORDER — BLOOD SUGAR DIAGNOSTIC
STRIP MISCELLANEOUS
Qty: 500 STRIP | Refills: 0 | Status: SHIPPED | OUTPATIENT
Start: 2021-07-19 | End: 2021-11-01

## 2021-07-19 RX ORDER — INSULIN ASPART 100 [IU]/ML
INJECTION, SOLUTION INTRAVENOUS; SUBCUTANEOUS
Qty: 30 ML | Refills: 3 | Status: SHIPPED | OUTPATIENT
Start: 2021-07-19

## 2021-07-19 RX ORDER — INSULIN DEGLUDEC INJECTION 100 U/ML
INJECTION, SOLUTION SUBCUTANEOUS
Qty: 15 ML | Refills: 3 | Status: SHIPPED | OUTPATIENT
Start: 2021-07-19

## 2021-07-28 ENCOUNTER — TELEPHONE (OUTPATIENT)
Dept: ENDOCRINOLOGY CLINIC | Facility: CLINIC | Age: 26
End: 2021-07-28

## 2021-07-28 NOTE — TELEPHONE ENCOUNTER
Pt is calling to see if her outstanding labs can [facility]. BZA#[id number].  Please call pt  To let her know

## 2021-08-05 PROCEDURE — 3061F NEG MICROALBUMINURIA REV: CPT | Performed by: INTERNAL MEDICINE

## 2021-08-06 LAB
ALBUMIN/GLOBULIN RATIO: 1.2 (CALC) (ref 1–2.5)
ALBUMIN: 4 G/DL (ref 3.6–5.1)
ALKALINE PHOSPHATASE: 36 U/L (ref 31–125)
ALT: 7 U/L (ref 6–29)
AST: 10 U/L (ref 10–30)
BILIRUBIN, TOTAL: 0.5 MG/DL (ref 0.2–1.2)
BUN: 19 MG/DL (ref 7–25)
CALCIUM: 9.2 MG/DL (ref 8.6–10.2)
CARBON DIOXIDE: 27 MMOL/L (ref 20–32)
CHLORIDE: 106 MMOL/L (ref 98–110)
CHOL/HDLC RATIO: 2.9 (CALC)
CHOLESTEROL, TOTAL: 145 MG/DL
CREATININE, RANDOM URINE: 253 MG/DL (ref 20–275)
CREATININE: 0.74 MG/DL (ref 0.5–1.1)
EGFR IF AFRICN AM: 131 ML/MIN/1.73M2
EGFR IF NONAFRICN AM: 113 ML/MIN/1.73M2
GLOBULIN: 3.4 G/DL (CALC) (ref 1.9–3.7)
GLUCOSE: 101 MG/DL (ref 65–99)
HDL CHOLESTEROL: 50 MG/DL
LDL-CHOLESTEROL: 79 MG/DL (CALC)
MICROALBUMIN/CREATININE RATIO, RANDOM URINE: 2 MCG/MG CREAT
MICROALBUMIN: 0.6 MG/DL
NON-HDL CHOLESTEROL: 95 MG/DL (CALC)
POTASSIUM: 4 MMOL/L (ref 3.5–5.3)
PROTEIN, TOTAL: 7.4 G/DL (ref 6.1–8.1)
SODIUM: 140 MMOL/L (ref 135–146)
T4, FREE: 1.2 NG/DL (ref 0.8–1.8)
TRIGLYCERIDES: 80 MG/DL
TSH: 5.02 MIU/L

## 2021-08-16 RX ORDER — NORETHINDRONE AND ETHINYL ESTRADIOL 1 MG-35MCG
KIT ORAL
Qty: 84 TABLET | Refills: 0 | Status: SHIPPED | OUTPATIENT
Start: 2021-08-16 | End: 2021-11-01

## 2021-08-23 ENCOUNTER — OFFICE VISIT (OUTPATIENT)
Dept: ENDOCRINOLOGY CLINIC | Facility: CLINIC | Age: 26
End: 2021-08-23
Payer: COMMERCIAL

## 2021-08-23 VITALS
BODY MASS INDEX: 25 KG/M2 | DIASTOLIC BLOOD PRESSURE: 82 MMHG | HEART RATE: 117 BPM | SYSTOLIC BLOOD PRESSURE: 126 MMHG | WEIGHT: 127 LBS

## 2021-08-23 DIAGNOSIS — E10.9 CONTROLLED DIABETES MELLITUS TYPE 1 WITHOUT COMPLICATIONS (HCC): Primary | ICD-10-CM

## 2021-08-23 DIAGNOSIS — E03.8 SUBCLINICAL HYPOTHYROIDISM: ICD-10-CM

## 2021-08-23 LAB
CARTRIDGE LOT#: NORMAL NUMERIC
GLUCOSE BLOOD: 89
HEMOGLOBIN A1C: 5.6 % (ref 4.3–5.6)
TEST STRIP LOT #: NORMAL NUMERIC

## 2021-08-23 PROCEDURE — 83036 HEMOGLOBIN GLYCOSYLATED A1C: CPT | Performed by: INTERNAL MEDICINE

## 2021-08-23 PROCEDURE — 36416 COLLJ CAPILLARY BLOOD SPEC: CPT | Performed by: INTERNAL MEDICINE

## 2021-08-23 PROCEDURE — 3044F HG A1C LEVEL LT 7.0%: CPT | Performed by: INTERNAL MEDICINE

## 2021-08-23 PROCEDURE — 3074F SYST BP LT 130 MM HG: CPT | Performed by: INTERNAL MEDICINE

## 2021-08-23 PROCEDURE — 99214 OFFICE O/P EST MOD 30 MIN: CPT | Performed by: INTERNAL MEDICINE

## 2021-08-23 PROCEDURE — 3079F DIAST BP 80-89 MM HG: CPT | Performed by: INTERNAL MEDICINE

## 2021-08-23 PROCEDURE — 82947 ASSAY GLUCOSE BLOOD QUANT: CPT | Performed by: INTERNAL MEDICINE

## 2021-08-23 NOTE — PATIENT INSTRUCTIONS
CONTINUE Tresiba 18 units subcutaneous daily    CHANGE Insulin to carb ratio to 1:8 with breakfast, lunch and 1:7 with dinner     Night before procedure    DECREASE Tresiba to 12 units subcutaneous     No insulin in the morning before procedure

## 2021-08-23 NOTE — PROGRESS NOTES
Name: Dottie Bower  Date: 8/23/2021    Referring Physician: No ref. provider found    HISTORY OF PRESENT ILLNESS   Dottie Bower is a 27year old female who presents for diabetes mellitus, diagnosed 8 years ago.   She has tried omnipod in the past but didn not apply Misc, USE WITH INSULIN INJECTIONS DAILY AS DIRECTED, Disp: 100 each, Rfl: 3  •  NOVOLOG 100 UNIT/ML Subcutaneous Solution, USE MAX DOSE OF 100UNITS DAILY AS DIRECTED., Disp: 30 mL, Rfl: 0  •  Ondansetron HCl (ZOFRAN) 8 MG tablet, Take 1 tablet (8 bilaterally  Musculoskeletal:  normal muscle strength and tone  Skin:  normal moisture and skin texture  Hair & Nails:  normal scalp hair     Hematologic:  no excessive bruising  Neuro:  sensory grossly intact and motor grossly intact  Psychiatric:  orient

## 2021-11-01 ENCOUNTER — TELEPHONE (OUTPATIENT)
Dept: ENDOCRINOLOGY CLINIC | Facility: CLINIC | Age: 26
End: 2021-11-01

## 2021-11-01 RX ORDER — BLOOD SUGAR DIAGNOSTIC
STRIP MISCELLANEOUS
Qty: 500 STRIP | Refills: 1 | Status: SHIPPED | OUTPATIENT
Start: 2021-11-01

## 2021-11-01 RX ORDER — NORETHINDRONE AND ETHINYL ESTRADIOL 1 MG-35MCG
KIT ORAL
Qty: 84 TABLET | Refills: 1 | Status: SHIPPED | OUTPATIENT
Start: 2021-11-01

## 2021-11-01 NOTE — TELEPHONE ENCOUNTER
LOV: 8/23/21    Future Appointments   Date Time Provider Arianna Fish   2/28/2022  9:15 AM Sandra Cardoso MD [address] [place] [address]

## 2021-11-01 NOTE — TELEPHONE ENCOUNTER
Pt requesting to have lab orders  Faxed to [address] [facility] fax: [phone]

## 2022-04-11 RX ORDER — NORETHINDRONE AND ETHINYL ESTRADIOL 1 MG-35MCG
KIT ORAL
Qty: 84 TABLET | Refills: 1 | Status: SHIPPED | OUTPATIENT
Start: 2022-04-11

## 2022-04-25 RX ORDER — PEN NEEDLE, DIABETIC 32GX 5/32"
NEEDLE, DISPOSABLE MISCELLANEOUS
Qty: 100 EACH | Refills: 3 | Status: SHIPPED | OUTPATIENT
Start: 2022-04-25

## 2022-04-25 NOTE — TELEPHONE ENCOUNTER
Insulin Pen Needle (BD PEN NEEDLE HAO U/F) 32G X 4 MM Does not apply Misc, Inject once daily, Disp: 100 each, Rfl: 3

## 2022-05-23 RX ORDER — INSULIN DEGLUDEC INJECTION 100 U/ML
INJECTION, SOLUTION SUBCUTANEOUS
Qty: 15 ML | Refills: 0 | Status: SHIPPED | OUTPATIENT
Start: 2022-05-23

## 2022-05-26 RX ORDER — PEN NEEDLE, DIABETIC 32GX 5/32"
NEEDLE, DISPOSABLE MISCELLANEOUS
Qty: 100 EACH | Refills: 3 | Status: SHIPPED | OUTPATIENT
Start: 2022-05-26

## 2022-06-13 RX ORDER — PEN NEEDLE, DIABETIC 32GX 5/32"
NEEDLE, DISPOSABLE MISCELLANEOUS
Qty: 100 EACH | Refills: 3 | Status: SHIPPED | OUTPATIENT
Start: 2022-06-13

## 2022-06-20 ENCOUNTER — TELEPHONE (OUTPATIENT)
Dept: ENDOCRINOLOGY CLINIC | Facility: CLINIC | Age: 27
End: 2022-06-20

## 2022-06-21 RX ORDER — ONDANSETRON HYDROCHLORIDE 8 MG/1
8 TABLET, FILM COATED ORAL EVERY 8 HOURS PRN
Qty: 10 TABLET | Refills: 0 | Status: SHIPPED | OUTPATIENT
Start: 2022-06-21

## 2022-07-06 ENCOUNTER — TELEPHONE (OUTPATIENT)
Dept: ENDOCRINOLOGY CLINIC | Facility: CLINIC | Age: 27
End: 2022-07-06

## 2022-07-06 DIAGNOSIS — E10.9 CONTROLLED DIABETES MELLITUS TYPE 1 WITHOUT COMPLICATIONS (HCC): ICD-10-CM

## 2022-07-06 DIAGNOSIS — E03.8 SUBCLINICAL HYPOTHYROIDISM: Primary | ICD-10-CM

## 2022-07-06 NOTE — TELEPHONE ENCOUNTER
pt requesting to get lab orders faxed to [facility] - fax # [phone].

## 2022-07-08 PROCEDURE — 3061F NEG MICROALBUMINURIA REV: CPT | Performed by: INTERNAL MEDICINE

## 2022-07-09 LAB
ALBUMIN/GLOBULIN RATIO: 1.3 (CALC) (ref 1–2.5)
ALBUMIN: 4 G/DL (ref 3.6–5.1)
ALKALINE PHOSPHATASE: 48 U/L (ref 31–125)
ALT: 9 U/L (ref 6–29)
AST: 11 U/L (ref 10–30)
BILIRUBIN, TOTAL: 0.3 MG/DL (ref 0.2–1.2)
BUN: 13 MG/DL (ref 7–25)
CALCIUM: 9.1 MG/DL (ref 8.6–10.2)
CARBON DIOXIDE: 26 MMOL/L (ref 20–32)
CHLORIDE: 107 MMOL/L (ref 98–110)
CHOL/HDLC RATIO: 3.1 (CALC)
CHOLESTEROL, TOTAL: 160 MG/DL
CREATININE, RANDOM URINE: 417 MG/DL (ref 20–275)
CREATININE: 0.72 MG/DL (ref 0.5–1.1)
EGFR IF AFRICN AM: 135 ML/MIN/1.73M2
EGFR IF NONAFRICN AM: 116 ML/MIN/1.73M2
GLOBULIN: 3.2 G/DL (CALC) (ref 1.9–3.7)
GLUCOSE: 95 MG/DL (ref 65–99)
HDL CHOLESTEROL: 51 MG/DL
LDL-CHOLESTEROL: 90 MG/DL (CALC)
MICROALBUMIN/CREATININE RATIO, RANDOM URINE: 3 MCG/MG CREAT
MICROALBUMIN: 1.1 MG/DL
NON-HDL CHOLESTEROL: 109 MG/DL (CALC)
POTASSIUM: 3.9 MMOL/L (ref 3.5–5.3)
PROTEIN, TOTAL: 7.2 G/DL (ref 6.1–8.1)
SODIUM: 139 MMOL/L (ref 135–146)
T4, FREE: 1.2 NG/DL (ref 0.8–1.8)
TRIGLYCERIDES: 96 MG/DL
TSH: 2.73 MIU/L

## 2022-07-29 ENCOUNTER — OFFICE VISIT (OUTPATIENT)
Dept: ENDOCRINOLOGY CLINIC | Facility: CLINIC | Age: 27
End: 2022-07-29
Payer: COMMERCIAL

## 2022-07-29 VITALS
HEART RATE: 109 BPM | BODY MASS INDEX: 24 KG/M2 | WEIGHT: 123.38 LBS | SYSTOLIC BLOOD PRESSURE: 108 MMHG | DIASTOLIC BLOOD PRESSURE: 73 MMHG

## 2022-07-29 DIAGNOSIS — E10.9 CONTROLLED DIABETES MELLITUS TYPE 1 WITHOUT COMPLICATIONS (HCC): Primary | ICD-10-CM

## 2022-07-29 LAB
CARTRIDGE LOT#: ABNORMAL NUMERIC
GLUCOSE BLOOD: 175
HEMOGLOBIN A1C: 6.8 % (ref 4.3–5.6)
TEST STRIP LOT #: NORMAL NUMERIC

## 2022-07-29 PROCEDURE — 82947 ASSAY GLUCOSE BLOOD QUANT: CPT | Performed by: INTERNAL MEDICINE

## 2022-07-29 PROCEDURE — 3044F HG A1C LEVEL LT 7.0%: CPT | Performed by: INTERNAL MEDICINE

## 2022-07-29 PROCEDURE — 3078F DIAST BP <80 MM HG: CPT | Performed by: INTERNAL MEDICINE

## 2022-07-29 PROCEDURE — 99214 OFFICE O/P EST MOD 30 MIN: CPT | Performed by: INTERNAL MEDICINE

## 2022-07-29 PROCEDURE — 83036 HEMOGLOBIN GLYCOSYLATED A1C: CPT | Performed by: INTERNAL MEDICINE

## 2022-07-29 PROCEDURE — 3074F SYST BP LT 130 MM HG: CPT | Performed by: INTERNAL MEDICINE

## 2022-07-29 RX ORDER — INSULIN DEGLUDEC INJECTION 100 U/ML
18 INJECTION, SOLUTION SUBCUTANEOUS DAILY
Qty: 15 ML | Refills: 2 | Status: SHIPPED | OUTPATIENT
Start: 2022-07-29

## 2022-07-29 RX ORDER — INSULIN ASPART 100 [IU]/ML
INJECTION, SOLUTION INTRAVENOUS; SUBCUTANEOUS
Qty: 30 ML | Refills: 3 | Status: SHIPPED | OUTPATIENT
Start: 2022-07-29

## 2022-07-29 RX ORDER — GLUCAGON INJECTION, SOLUTION 1 MG/.2ML
1 INJECTION, SOLUTION SUBCUTANEOUS AS NEEDED
Qty: 1 EACH | Refills: 1 | Status: SHIPPED | OUTPATIENT
Start: 2022-07-29

## 2022-08-15 ENCOUNTER — TELEPHONE (OUTPATIENT)
Dept: ENDOCRINOLOGY CLINIC | Facility: CLINIC | Age: 27
End: 2022-08-15

## 2022-08-15 DIAGNOSIS — E10.9 CONTROLLED DIABETES MELLITUS TYPE 1 WITHOUT COMPLICATIONS (HCC): Primary | ICD-10-CM

## 2022-08-15 RX ORDER — PEN NEEDLE, DIABETIC 32GX 5/32"
NEEDLE, DISPOSABLE MISCELLANEOUS
Qty: 200 EACH | Refills: 3 | Status: SHIPPED | OUTPATIENT
Start: 2022-08-15

## 2022-08-15 NOTE — TELEPHONE ENCOUNTER
Patient called to request an increase on the amount of pen needles prescribed. Patient Currently using 4-6 times a day.    Prescription resent   Medications for DM   Tresiba 18 units SQ daily   Novolog I:CR 1:8  Correction 1:25>140

## 2022-09-07 DIAGNOSIS — E10.9 CONTROLLED DIABETES MELLITUS TYPE 1 WITHOUT COMPLICATIONS (HCC): ICD-10-CM

## 2022-09-08 RX ORDER — PEN NEEDLE, DIABETIC 32GX 5/32"
NEEDLE, DISPOSABLE MISCELLANEOUS
Qty: 200 EACH | Refills: 3 | Status: SHIPPED | OUTPATIENT
Start: 2022-09-08

## 2022-09-23 DIAGNOSIS — E10.9 CONTROLLED DIABETES MELLITUS TYPE 1 WITHOUT COMPLICATIONS (HCC): ICD-10-CM

## 2022-09-26 RX ORDER — PEN NEEDLE, DIABETIC 32GX 5/32"
NEEDLE, DISPOSABLE MISCELLANEOUS
Qty: 200 EACH | Refills: 3 | Status: SHIPPED | OUTPATIENT
Start: 2022-09-26

## 2022-10-12 ENCOUNTER — TELEPHONE (OUTPATIENT)
Dept: ENDOCRINOLOGY CLINIC | Facility: CLINIC | Age: 27
End: 2022-10-12

## 2022-10-13 RX ORDER — NORETHINDRONE AND ETHINYL ESTRADIOL 1 MG-35MCG
1 KIT ORAL DAILY
Qty: 84 TABLET | Refills: 1 | Status: SHIPPED | OUTPATIENT
Start: 2022-10-13

## 2023-01-18 ENCOUNTER — TELEPHONE (OUTPATIENT)
Dept: ENDOCRINOLOGY CLINIC | Facility: CLINIC | Age: 28
End: 2023-01-18

## 2023-01-18 DIAGNOSIS — E03.8 SUBCLINICAL HYPOTHYROIDISM: Primary | ICD-10-CM

## 2023-01-18 NOTE — TELEPHONE ENCOUNTER
Dr. Velazquez Erps,  Patient has f/u on 1/27/23  TSH and T4 pended for approval for [facility] - please advise if additional labs needed (CMP, lipids and urine micro/alb checked in July 2022)  Thanks

## 2023-01-18 NOTE — TELEPHONE ENCOUNTER
Pt is requesting for lab work orders to be faxed to [employer] [facility] please follow up fax # [phone]

## 2023-01-18 NOTE — TELEPHONE ENCOUNTER
Orders faxed to [facility] at [phone]  Spoke to patient to advise thyroid lab orders faxed to [place]

## 2023-01-22 LAB
T4, FREE: 1.1 NG/DL (ref 0.8–1.8)
TSH: 3.66 MIU/L

## 2023-01-27 ENCOUNTER — OFFICE VISIT (OUTPATIENT)
Dept: ENDOCRINOLOGY CLINIC | Facility: CLINIC | Age: 28
End: 2023-01-27

## 2023-01-27 VITALS
WEIGHT: 123 LBS | BODY MASS INDEX: 24 KG/M2 | DIASTOLIC BLOOD PRESSURE: 85 MMHG | SYSTOLIC BLOOD PRESSURE: 118 MMHG | HEART RATE: 108 BPM

## 2023-01-27 DIAGNOSIS — E10.9 CONTROLLED DIABETES MELLITUS TYPE 1 WITHOUT COMPLICATIONS (HCC): Primary | ICD-10-CM

## 2023-01-27 LAB
CARTRIDGE LOT#: ABNORMAL NUMERIC
GLUCOSE BLOOD: 187
HEMOGLOBIN A1C: 6.1 % (ref 4.3–5.6)
TEST STRIP LOT #: NORMAL NUMERIC

## 2023-01-27 PROCEDURE — 3044F HG A1C LEVEL LT 7.0%: CPT | Performed by: INTERNAL MEDICINE

## 2023-01-27 PROCEDURE — 83036 HEMOGLOBIN GLYCOSYLATED A1C: CPT | Performed by: INTERNAL MEDICINE

## 2023-01-27 PROCEDURE — 99214 OFFICE O/P EST MOD 30 MIN: CPT | Performed by: INTERNAL MEDICINE

## 2023-01-27 PROCEDURE — 3079F DIAST BP 80-89 MM HG: CPT | Performed by: INTERNAL MEDICINE

## 2023-01-27 PROCEDURE — 82947 ASSAY GLUCOSE BLOOD QUANT: CPT | Performed by: INTERNAL MEDICINE

## 2023-01-27 PROCEDURE — 3074F SYST BP LT 130 MM HG: CPT | Performed by: INTERNAL MEDICINE

## 2023-03-06 RX ORDER — NORETHINDRONE AND ETHINYL ESTRADIOL 1 MG-35MCG
1 KIT ORAL DAILY
Qty: 84 TABLET | Refills: 1 | Status: SHIPPED | OUTPATIENT
Start: 2023-03-06

## 2023-03-06 RX ORDER — INSULIN DEGLUDEC INJECTION 100 U/ML
20 INJECTION, SOLUTION SUBCUTANEOUS DAILY
Qty: 15 ML | Refills: 1 | Status: SHIPPED | OUTPATIENT
Start: 2023-03-06

## 2023-03-20 ENCOUNTER — TELEPHONE (OUTPATIENT)
Dept: ENDOCRINOLOGY CLINIC | Facility: CLINIC | Age: 28
End: 2023-03-20

## 2023-03-21 ENCOUNTER — MED REC SCAN ONLY (OUTPATIENT)
Dept: ENDOCRINOLOGY CLINIC | Facility: CLINIC | Age: 28
End: 2023-03-21

## 2023-03-27 RX ORDER — PERPHENAZINE 16 MG/1
TABLET, FILM COATED ORAL
Qty: 500 STRIP | Refills: 1 | Status: SHIPPED | OUTPATIENT
Start: 2023-03-27

## 2023-03-27 NOTE — TELEPHONE ENCOUNTER
LOV: 1/27/23    Future Appointments   Date Time Provider Arianna Fish   7/28/2023 11:30 AM Oneal Mosley MD [facility]     Chart reviewed.  Refilled per protocol

## 2023-06-29 ENCOUNTER — TELEPHONE (OUTPATIENT)
Dept: ENDOCRINOLOGY CLINIC | Facility: CLINIC | Age: 28
End: 2023-06-29

## 2023-06-30 RX ORDER — PERPHENAZINE 16 MG/1
TABLET, FILM COATED ORAL
Qty: 500 STRIP | Refills: 1 | Status: SHIPPED | OUTPATIENT
Start: 2023-06-30

## 2023-07-06 ENCOUNTER — PATIENT MESSAGE (OUTPATIENT)
Dept: ENDOCRINOLOGY CLINIC | Facility: CLINIC | Age: 28
End: 2023-07-06

## 2023-07-10 RX ORDER — PERPHENAZINE 16 MG/1
TABLET, FILM COATED ORAL
Qty: 500 STRIP | Refills: 1 | Status: SHIPPED | OUTPATIENT
Start: 2023-07-10

## 2023-07-14 ENCOUNTER — TELEPHONE (OUTPATIENT)
Dept: ENDOCRINOLOGY CLINIC | Facility: CLINIC | Age: 28
End: 2023-07-14

## 2023-07-14 NOTE — TELEPHONE ENCOUNTER
Pt is requesting lab orders to be Fax to [facility].  Fax [phone]    Please send [facility] message after orders have been sent.

## 2023-07-14 NOTE — TELEPHONE ENCOUNTER
Lab orders dtd 1/27/23 faxed to [facility] at [phone]  [facility] sent updating patient

## 2023-07-17 ENCOUNTER — PATIENT MESSAGE (OUTPATIENT)
Dept: ENDOCRINOLOGY CLINIC | Facility: CLINIC | Age: 28
End: 2023-07-17

## 2023-07-18 DIAGNOSIS — E10.9 CONTROLLED DIABETES MELLITUS TYPE 1 WITHOUT COMPLICATIONS (HCC): ICD-10-CM

## 2023-07-18 NOTE — TELEPHONE ENCOUNTER
RN updated pharmacy information on file and instructed patient to inform office if she is in need of refills.

## 2023-07-19 RX ORDER — PEN NEEDLE, DIABETIC 32GX 5/32"
NEEDLE, DISPOSABLE MISCELLANEOUS
Qty: 600 EACH | Refills: 3 | Status: SHIPPED | OUTPATIENT
Start: 2023-07-19

## 2023-07-19 RX ORDER — BLOOD SUGAR DIAGNOSTIC
STRIP MISCELLANEOUS
Qty: 500 STRIP | Refills: 1 | Status: SHIPPED | OUTPATIENT
Start: 2023-07-19

## 2023-07-22 PROCEDURE — 3061F NEG MICROALBUMINURIA REV: CPT | Performed by: INTERNAL MEDICINE

## 2023-07-23 LAB
ABSOLUTE BASOPHILS: 71 CELLS/UL (ref 0–200)
ABSOLUTE EOSINOPHILS: 148 CELLS/UL (ref 15–500)
ABSOLUTE LYMPHOCYTES: 2351 CELLS/UL (ref 850–3900)
ABSOLUTE MONOCYTES: 469 CELLS/UL (ref 200–950)
ABSOLUTE NEUTROPHILS: 2060 CELLS/UL (ref 1500–7800)
ALBUMIN/GLOBULIN RATIO: 1.1 (CALC) (ref 1–2.5)
ALBUMIN: 4 G/DL (ref 3.6–5.1)
ALKALINE PHOSPHATASE: 41 U/L (ref 31–125)
ALT: 9 U/L (ref 6–29)
AST: 12 U/L (ref 10–30)
BASOPHILS: 1.4 %
BILIRUBIN, TOTAL: 0.4 MG/DL (ref 0.2–1.2)
BUN: 17 MG/DL (ref 7–25)
CALCIUM: 9.5 MG/DL (ref 8.6–10.2)
CARBON DIOXIDE: 28 MMOL/L (ref 20–32)
CHLORIDE: 104 MMOL/L (ref 98–110)
CHOL/HDLC RATIO: 2.8 (CALC)
CHOLESTEROL, TOTAL: 156 MG/DL
CREATININE, RANDOM URINE: 249 MG/DL (ref 20–275)
CREATININE: 0.72 MG/DL (ref 0.5–0.96)
EGFR: 118 ML/MIN/1.73M2
EOSINOPHILS: 2.9 %
GLOBULIN: 3.5 G/DL (CALC) (ref 1.9–3.7)
GLUCOSE: 142 MG/DL (ref 65–99)
HDL CHOLESTEROL: 55 MG/DL
HEMATOCRIT: 34.7 % (ref 35–45)
HEMOGLOBIN: 10.5 G/DL (ref 11.7–15.5)
LDL-CHOLESTEROL: 81 MG/DL (CALC)
LYMPHOCYTES: 46.1 %
MCH: 24.9 PG (ref 27–33)
MCHC: 30.3 G/DL (ref 32–36)
MCV: 82.2 FL (ref 80–100)
MICROALBUMIN/CREATININE RATIO, RANDOM URINE: 3 MCG/MG CREAT
MICROALBUMIN: 0.8 MG/DL
MONOCYTES: 9.2 %
MPV: 11.3 FL (ref 7.5–12.5)
NEUTROPHILS: 40.4 %
NON-HDL CHOLESTEROL: 101 MG/DL (CALC)
PLATELET COUNT: 314 THOUSAND/UL (ref 140–400)
POTASSIUM: 4.4 MMOL/L (ref 3.5–5.3)
PROTEIN, TOTAL: 7.5 G/DL (ref 6.1–8.1)
RDW: 16.5 % (ref 11–15)
RED BLOOD CELL COUNT: 4.22 MILLION/UL (ref 3.8–5.1)
SODIUM: 139 MMOL/L (ref 135–146)
T4, FREE: 1.1 NG/DL (ref 0.8–1.8)
TRIGLYCERIDES: 107 MG/DL
TSH: 2.96 MIU/L
WHITE BLOOD CELL COUNT: 5.1 THOUSAND/UL (ref 3.8–10.8)

## 2023-07-24 RX ORDER — INSULIN DEGLUDEC INJECTION 100 U/ML
20 INJECTION, SOLUTION SUBCUTANEOUS DAILY
Qty: 15 ML | Refills: 1 | Status: SHIPPED | OUTPATIENT
Start: 2023-07-24

## 2023-07-24 RX ORDER — GLUCAGON INJECTION, SOLUTION 1 MG/.2ML
1 INJECTION, SOLUTION SUBCUTANEOUS AS NEEDED
Qty: 1 EACH | Refills: 1 | Status: SHIPPED | OUTPATIENT
Start: 2023-07-24

## 2023-07-24 RX ORDER — INSULIN ASPART 100 [IU]/ML
INJECTION, SOLUTION INTRAVENOUS; SUBCUTANEOUS
Qty: 30 ML | Refills: 1 | Status: SHIPPED | OUTPATIENT
Start: 2023-07-24

## 2023-07-28 ENCOUNTER — OFFICE VISIT (OUTPATIENT)
Dept: ENDOCRINOLOGY CLINIC | Facility: CLINIC | Age: 28
End: 2023-07-28

## 2023-07-28 VITALS
WEIGHT: 122 LBS | BODY MASS INDEX: 24 KG/M2 | SYSTOLIC BLOOD PRESSURE: 127 MMHG | HEART RATE: 103 BPM | DIASTOLIC BLOOD PRESSURE: 72 MMHG

## 2023-07-28 DIAGNOSIS — E10.9 CONTROLLED DIABETES MELLITUS TYPE 1 WITHOUT COMPLICATIONS (HCC): Primary | ICD-10-CM

## 2023-07-28 LAB
CARTRIDGE LOT#: ABNORMAL NUMERIC
GLUCOSE BLOOD: 215
HEMOGLOBIN A1C: 6.7 % (ref 4.3–5.6)
TEST STRIP LOT #: NORMAL NUMERIC

## 2023-07-28 PROCEDURE — 3044F HG A1C LEVEL LT 7.0%: CPT | Performed by: INTERNAL MEDICINE

## 2023-07-28 PROCEDURE — 3078F DIAST BP <80 MM HG: CPT | Performed by: INTERNAL MEDICINE

## 2023-07-28 PROCEDURE — 83036 HEMOGLOBIN GLYCOSYLATED A1C: CPT | Performed by: INTERNAL MEDICINE

## 2023-07-28 PROCEDURE — 82947 ASSAY GLUCOSE BLOOD QUANT: CPT | Performed by: INTERNAL MEDICINE

## 2023-07-28 PROCEDURE — 3074F SYST BP LT 130 MM HG: CPT | Performed by: INTERNAL MEDICINE

## 2023-07-28 PROCEDURE — 99214 OFFICE O/P EST MOD 30 MIN: CPT | Performed by: INTERNAL MEDICINE

## 2023-08-16 RX ORDER — NORETHINDRONE AND ETHINYL ESTRADIOL 1 MG-35MCG
1 KIT ORAL DAILY
Qty: 84 TABLET | Refills: 1 | Status: SHIPPED | OUTPATIENT
Start: 2023-08-16

## 2023-08-30 ENCOUNTER — PATIENT MESSAGE (OUTPATIENT)
Dept: ENDOCRINOLOGY CLINIC | Facility: CLINIC | Age: 28
End: 2023-08-30

## 2023-09-24 DIAGNOSIS — E10.9 CONTROLLED DIABETES MELLITUS TYPE 1 WITHOUT COMPLICATIONS (HCC): ICD-10-CM

## 2023-09-25 RX ORDER — BLOOD SUGAR DIAGNOSTIC
STRIP MISCELLANEOUS
Qty: 500 STRIP | Refills: 1 | Status: SHIPPED | OUTPATIENT
Start: 2023-09-25

## 2023-10-04 RX ORDER — INSULIN DEGLUDEC INJECTION 100 U/ML
20 INJECTION, SOLUTION SUBCUTANEOUS DAILY
Qty: 18 ML | Refills: 1 | Status: SHIPPED | OUTPATIENT
Start: 2023-10-04

## 2023-10-04 RX ORDER — INSULIN ASPART 100 [IU]/ML
INJECTION, SOLUTION INTRAVENOUS; SUBCUTANEOUS
Qty: 45 ML | Refills: 1 | Status: SHIPPED | OUTPATIENT
Start: 2023-10-04

## 2023-10-22 ENCOUNTER — PATIENT MESSAGE (OUTPATIENT)
Dept: ENDOCRINOLOGY CLINIC | Facility: CLINIC | Age: 28
End: 2023-10-22

## 2023-10-22 DIAGNOSIS — E10.9 CONTROLLED DIABETES MELLITUS TYPE 1 WITHOUT COMPLICATIONS (HCC): ICD-10-CM

## 2023-10-23 NOTE — TELEPHONE ENCOUNTER
From: Nikki Ofelia  To: Veronique Valle  Sent: 10/22/2023 10:02 PM CDT  Subject: Pharmacy Change    Hello! My pharmacy location is closing so I need my prescriptions sent to a different location - so sorry! The address is as follows:     [facility]    That address is the address for a [employer] but there is a [facility] pharmacy located within it. Please let me know if there is any other information you may need from me!  Thank you!!

## 2023-10-25 RX ORDER — INSULIN DEGLUDEC INJECTION 100 U/ML
20 INJECTION, SOLUTION SUBCUTANEOUS DAILY
Qty: 18 ML | Refills: 1 | Status: SHIPPED | OUTPATIENT
Start: 2023-10-25

## 2023-10-25 RX ORDER — PEN NEEDLE, DIABETIC 32GX 5/32"
NEEDLE, DISPOSABLE MISCELLANEOUS
Qty: 600 EACH | Refills: 3 | Status: SHIPPED | OUTPATIENT
Start: 2023-10-25

## 2023-10-25 RX ORDER — BLOOD SUGAR DIAGNOSTIC
STRIP MISCELLANEOUS
Qty: 500 STRIP | Refills: 1 | Status: SHIPPED | OUTPATIENT
Start: 2023-10-25

## 2023-10-25 RX ORDER — INSULIN ASPART 100 [IU]/ML
INJECTION, SOLUTION INTRAVENOUS; SUBCUTANEOUS
Qty: 45 ML | Refills: 1 | Status: SHIPPED | OUTPATIENT
Start: 2023-10-25

## 2023-10-25 NOTE — TELEPHONE ENCOUNTER
Novolog cartrdiges, tresiba pens, pen needles, and accucheck guide test strips sent into [facility] [place]

## 2023-12-04 RX ORDER — NORETHINDRONE AND ETHINYL ESTRADIOL 1 MG-35MCG
1 KIT ORAL DAILY
Qty: 84 TABLET | Refills: 1 | Status: SHIPPED | OUTPATIENT
Start: 2023-12-04

## 2023-12-28 DIAGNOSIS — E10.9 CONTROLLED DIABETES MELLITUS TYPE 1 WITHOUT COMPLICATIONS (HCC): ICD-10-CM

## 2023-12-30 NOTE — TELEPHONE ENCOUNTER
LOV: 7/28/23     Next office visit: 1/19/24     Last filled: 10/25/23     Order pended and routed to Provider

## 2024-01-02 RX ORDER — BLOOD SUGAR DIAGNOSTIC
STRIP MISCELLANEOUS
Qty: 500 STRIP | Refills: 1 | Status: SHIPPED | OUTPATIENT
Start: 2024-01-02

## 2024-01-02 RX ORDER — PEN NEEDLE, DIABETIC 32GX 5/32"
NEEDLE, DISPOSABLE MISCELLANEOUS
Qty: 600 EACH | Refills: 3 | Status: SHIPPED | OUTPATIENT
Start: 2024-01-02

## 2024-01-10 DIAGNOSIS — E10.9 CONTROLLED DIABETES MELLITUS TYPE 1 WITHOUT COMPLICATIONS (HCC): ICD-10-CM

## 2024-01-11 RX ORDER — BLOOD SUGAR DIAGNOSTIC
STRIP MISCELLANEOUS
Qty: 500 STRIP | Refills: 1 | Status: SHIPPED | OUTPATIENT
Start: 2024-01-11

## 2024-03-01 RX ORDER — INSULIN ASPART 100 [IU]/ML
INJECTION, SOLUTION INTRAVENOUS; SUBCUTANEOUS
Qty: 45 ML | Refills: 1 | Status: SHIPPED | OUTPATIENT
Start: 2024-03-01

## 2024-03-01 RX ORDER — INSULIN DEGLUDEC INJECTION 100 U/ML
20 INJECTION, SOLUTION SUBCUTANEOUS DAILY
Qty: 18 ML | Refills: 1 | Status: SHIPPED | OUTPATIENT
Start: 2024-03-01

## 2024-03-01 NOTE — TELEPHONE ENCOUNTER
LOV: 7/28/23    RTC:  Months    FU: 5/3/24    Last Refill:   Tresiba: 10/25/23  Novolog:10/25/23

## 2024-04-01 DIAGNOSIS — E10.9 CONTROLLED DIABETES MELLITUS TYPE 1 WITHOUT COMPLICATIONS (HCC): ICD-10-CM

## 2024-04-01 RX ORDER — BLOOD SUGAR DIAGNOSTIC
STRIP MISCELLANEOUS
Qty: 500 STRIP | Refills: 1 | Status: SHIPPED | OUTPATIENT
Start: 2024-04-01

## 2024-04-01 RX ORDER — NORETHINDRONE AND ETHINYL ESTRADIOL 1 MG-35MCG
1 KIT ORAL DAILY
Qty: 84 TABLET | Refills: 1 | Status: SHIPPED | OUTPATIENT
Start: 2024-04-01

## 2024-04-01 RX ORDER — PEN NEEDLE, DIABETIC 32GX 5/32"
NEEDLE, DISPOSABLE MISCELLANEOUS
Qty: 600 EACH | Refills: 3 | Status: SHIPPED | OUTPATIENT
Start: 2024-04-01

## 2024-04-01 RX ORDER — ONDANSETRON HYDROCHLORIDE 8 MG/1
8 TABLET, FILM COATED ORAL EVERY 8 HOURS PRN
Qty: 10 TABLET | Refills: 0 | Status: SHIPPED | OUTPATIENT
Start: 2024-04-01

## 2024-05-13 ENCOUNTER — PATIENT MESSAGE (OUTPATIENT)
Dept: ENDOCRINOLOGY CLINIC | Facility: CLINIC | Age: 29
End: 2024-05-13

## 2024-05-13 RX ORDER — PEN NEEDLE, DIABETIC 32GX 5/32"
NEEDLE, DISPOSABLE MISCELLANEOUS
Qty: 100 EACH | Refills: 3 | Status: SHIPPED | OUTPATIENT
Start: 2024-05-13

## 2024-05-13 RX ORDER — SYRINGE-NEEDLE,INSULIN,0.5 ML 27GX1/2"
SYRINGE, EMPTY DISPOSABLE MISCELLANEOUS
Qty: 50 EACH | Refills: 0 | Status: SHIPPED | OUTPATIENT
Start: 2024-05-13

## 2024-05-13 NOTE — TELEPHONE ENCOUNTER
From: Riya Anderson  To: Anamika Curran  Sent: 5/13/2024 3:21 PM CDT  Subject: Insulin Needle Refill    Good afternoon! I was wondering if there was any way I could get a small refill of some insulin needles? I lost my insulin pen and I’m waiting for the new one to be shipped in so in the meantime I don’t have much to inject my novolog insulin with. If I’m able to get a small amount of regular syringes that would be great! Thank you!

## 2024-05-13 NOTE — TELEPHONE ENCOUNTER
Endocrine refill protocol for rapid acting, regular, intermediate, and mixed insulin:      Protocol Criteria:  Appointment with Endocrinology completed in the last 6 months or scheduled in the next 3 months     Verify appointment has been completed or scheduled in the appropriate timeline. If so can send a 90 day supply with 1 refill.   Verify A1C has been completed in the last 6 months and is <8.5%    -May substitute prescriptions for Novolog and Humalog unless documented allergy (pens and vials) at the same dose and concentration per insurance preference and provider protocol.   -May substitute prescriptions for Novolin R and Humulin R unless documented allergy (pens and vials) at the same dose and concentration per insurance preference and provider protocol.   -May substitute prescriptions for Novolin N and Humulin N unless documented allergy (pens and vials) at the same dose and concentration per insurance preference and provider protocol.   -May substitute prescriptions for Humulin and Novolin 70/30 insulin unless documented allergy at the same dose and concentration per insurance preference and provider protocol.        Last completed office visit: Visit date not found   Next scheduled Follow up:   Future Appointments   Date Time Provider Department Center   7/22/2024  2:00 PM Anamika Curran MD [facility]      Last A1C result: 6.7% done 7/28/2023.

## 2024-06-04 DIAGNOSIS — E10.9 CONTROLLED DIABETES MELLITUS TYPE 1 WITHOUT COMPLICATIONS (HCC): ICD-10-CM

## 2024-06-04 RX ORDER — INSULIN ASPART 100 [IU]/ML
INJECTION, SOLUTION INTRAVENOUS; SUBCUTANEOUS
Qty: 45 ML | Refills: 1 | Status: SHIPPED | OUTPATIENT
Start: 2024-06-04

## 2024-06-04 RX ORDER — PEN NEEDLE, DIABETIC 32GX 5/32"
NEEDLE, DISPOSABLE MISCELLANEOUS
Qty: 600 EACH | Refills: 3 | Status: SHIPPED | OUTPATIENT
Start: 2024-06-04

## 2024-06-04 NOTE — TELEPHONE ENCOUNTER
Endocrine Refill protocol for Glucose testing supplies       Protocol Criteria:pass  Appointment with Endocrinology completed in the last 12 months or scheduled in the next 6 months     Verify appointment has been completed or scheduled in the appropriate timeline. If so can send a 90 day supply with 1 refill.        Last completed office visit:07/28/23  Next scheduled Follow up: 07/22/24

## 2024-07-10 ENCOUNTER — PATIENT MESSAGE (OUTPATIENT)
Dept: ENDOCRINOLOGY CLINIC | Facility: CLINIC | Age: 29
End: 2024-07-10

## 2024-07-10 DIAGNOSIS — E03.8 SUBCLINICAL HYPOTHYROIDISM: Primary | ICD-10-CM

## 2024-07-10 DIAGNOSIS — E10.9 CONTROLLED DIABETES MELLITUS TYPE 1 WITHOUT COMPLICATIONS (HCC): ICD-10-CM

## 2024-07-11 NOTE — TELEPHONE ENCOUNTER
Dr Curran,    Patient has an apt coming up on 7/22 and wondering if she should complete labs.    Noted previous labs ordered and pended    Please sign if appropriate,    Thank you

## 2024-08-21 NOTE — TELEPHONE ENCOUNTER
Glucose Blood (ACCU-CHEK GUIDE) In Vitro Strip, Use as directed to check blood glucose 5 times per day, Disp: 500 strip, Rfl: 1    Alternative requested: insurance no longer covers accuchek. Please get new RX for freestyle lite or one touch ultra

## 2024-08-22 RX ORDER — BLOOD SUGAR DIAGNOSTIC
STRIP MISCELLANEOUS
Qty: 500 EACH | Refills: 1 | Status: SHIPPED | OUTPATIENT
Start: 2024-08-22

## 2024-08-29 ENCOUNTER — TELEPHONE (OUTPATIENT)
Dept: ENDOCRINOLOGY CLINIC | Facility: CLINIC | Age: 29
End: 2024-08-29

## 2024-08-29 NOTE — TELEPHONE ENCOUNTER
Current Outpatient Medications   Medication Sig Dispense Refill           insulin aspart (NOVOLOG PENFILL) 100 UNIT/ML Subcutaneous Solution Cartridge INJECT 50 UNITS DAILY VIA INSULIN SCALE 45 mL 1                        Alternative Requested: Novolog no longer covered on insurance ,They cover Humalog,Insulin Lispro and Lumjev kwikpen.Please send alternatives.

## 2024-08-30 RX ORDER — INSULIN PEN,REUSABLE,BT LISPRO
INSULIN PEN (EA) SUBCUTANEOUS
Qty: 1 EACH | Refills: 0 | Status: SHIPPED | OUTPATIENT
Start: 2024-08-30

## 2024-08-30 RX ORDER — INSULIN LISPRO 100 [IU]/ML
INJECTION, SOLUTION INTRAVENOUS; SUBCUTANEOUS
Qty: 45 ML | Refills: 0 | Status: SHIPPED | OUTPATIENT
Start: 2024-08-30 | End: 2024-11-18

## 2024-08-30 NOTE — TELEPHONE ENCOUNTER
Humalog cartridge and Humalog InPen sent to pharmacy. Left message to call back and MyChart also sent to patient.

## 2024-08-30 NOTE — TELEPHONE ENCOUNTER
Please see message below. Also pt not seen since 7/28/23, pt cancelled 3 appts. Pt has upcoming appt 11/18/24.

## 2024-09-18 RX ORDER — INSULIN DEGLUDEC 100 U/ML
20 INJECTION, SOLUTION SUBCUTANEOUS DAILY
Qty: 18 ML | Refills: 1 | Status: SHIPPED | OUTPATIENT
Start: 2024-09-18

## 2024-09-18 NOTE — TELEPHONE ENCOUNTER
Endocrine refill protocol for rapid acting, regular, intermediate, and mixed insulin:    Protocol Criteria:  PASSED Reason: N/A - last seen 7/28/23 - has upcoming appt 11/18/24  Appointment with Endocrinology completed in the last 6 months or scheduled in the next 3 months     Verify appointment has been completed or scheduled in the appropriate timeline. If so can send a 90 day supply with 1 refill.   Verify A1C has been completed in the last 6 months and is below 8.5%    -May substitute prescriptions for Novolog and Humalog unless documented allergy (pens and vials) at the same dose and concentration per insurance preference and provider protocol.   -May substitute prescriptions for Novolin R and Humulin R unless documented allergy (pens and vials) at the same dose and concentration per insurance preference and provider protocol.   -May substitute prescriptions for Novolin N and Humulin N unless documented allergy (pens and vials) at the same dose and concentration per insurance preference and provider protocol.   -May substitute prescriptions for Humulin and Novolin 70/30 insulin unless documented allergy at the same dose and concentration per insurance preference and provider protocol.    Last completed office visit: Visit date not found   Next scheduled Follow up:   Future Appointments   Date Time Provider Department Center   11/18/2024  1:30 PM Anamika Curran MD [facility]      Last A1C result: 6.7% done 7/28/2023.

## 2024-10-30 RX ORDER — NORETHINDRONE AND ETHINYL ESTRADIOL 1 MG-35MCG
1 KIT ORAL DAILY
Qty: 84 TABLET | Refills: 1 | Status: SHIPPED | OUTPATIENT
Start: 2024-10-30

## 2024-10-30 NOTE — TELEPHONE ENCOUNTER
Endocrine Refill protocol for oral medications    Protocol Criteria:  PASSED  Reason: N/A    If below requirement is met, send a 90-day supply with 1 refill per provider protocol.    Verify appointment with Endocrinology completed in the last 6 months or scheduled in the next 3 months.    Last completed office visit: 7/28/23  Next scheduled Follow up:   Future Appointments   Date Time Provider Department Center   11/18/2024  1:30 PM Anamika Curran MD [facility]

## 2024-11-12 ENCOUNTER — PATIENT MESSAGE (OUTPATIENT)
Dept: ENDOCRINOLOGY CLINIC | Facility: CLINIC | Age: 29
End: 2024-11-12

## 2024-11-17 LAB
ABSOLUTE BASOPHILS: 48 CELLS/UL (ref 0–200)
ABSOLUTE EOSINOPHILS: 104 CELLS/UL (ref 15–500)
ABSOLUTE LYMPHOCYTES: 2801 CELLS/UL (ref 850–3900)
ABSOLUTE MONOCYTES: 573 CELLS/UL (ref 200–950)
ABSOLUTE NEUTROPHILS: 3374 CELLS/UL (ref 1500–7800)
ALBUMIN/GLOBULIN RATIO: 1.2 (CALC) (ref 1–2.5)
ALBUMIN: 4.1 G/DL (ref 3.6–5.1)
ALKALINE PHOSPHATASE: 40 U/L (ref 31–125)
ALT: 9 U/L (ref 6–29)
AST: 12 U/L (ref 10–30)
BASOPHILS: 0.7 %
BILIRUBIN, TOTAL: 0.4 MG/DL (ref 0.2–1.2)
BUN: 18 MG/DL (ref 7–25)
CALCIUM: 9.1 MG/DL (ref 8.6–10.2)
CARBON DIOXIDE: 25 MMOL/L (ref 20–32)
CHLORIDE: 104 MMOL/L (ref 98–110)
CHOL/HDLC RATIO: 3.3 (CALC)
CHOLESTEROL, TOTAL: 168 MG/DL
CREATININE, RANDOM URINE: 248 MG/DL (ref 20–275)
CREATININE: 0.75 MG/DL (ref 0.5–0.96)
EGFR: 112 ML/MIN/1.73M2
EOSINOPHILS: 1.5 %
GLOBULIN: 3.5 G/DL (CALC) (ref 1.9–3.7)
GLUCOSE: 171 MG/DL (ref 65–99)
HDL CHOLESTEROL: 51 MG/DL
HEMATOCRIT: 38.6 % (ref 35–45)
HEMOGLOBIN: 12.2 G/DL (ref 11.7–15.5)
LDL-CHOLESTEROL: 94 MG/DL (CALC)
LYMPHOCYTES: 40.6 %
MCH: 27.4 PG (ref 27–33)
MCHC: 31.6 G/DL (ref 32–36)
MCV: 86.7 FL (ref 80–100)
MICROALBUMIN/CREATININE RATIO, RANDOM URINE: 2 MG/G CREAT
MICROALBUMIN: 0.6 MG/DL
MONOCYTES: 8.3 %
MPV: 11 FL (ref 7.5–12.5)
NEUTROPHILS: 48.9 %
NON-HDL CHOLESTEROL: 117 MG/DL (CALC)
PLATELET COUNT: 294 THOUSAND/UL (ref 140–400)
POTASSIUM: 4.1 MMOL/L (ref 3.5–5.3)
PROTEIN, TOTAL: 7.6 G/DL (ref 6.1–8.1)
RDW: 20 % (ref 11–15)
RED BLOOD CELL COUNT: 4.45 MILLION/UL (ref 3.8–5.1)
SODIUM: 137 MMOL/L (ref 135–146)
T4, FREE: 1.2 NG/DL (ref 0.8–1.8)
TRIGLYCERIDES: 129 MG/DL
TSH: 2.62 MIU/L
WHITE BLOOD CELL COUNT: 6.9 THOUSAND/UL (ref 3.8–10.8)

## 2024-11-18 ENCOUNTER — OFFICE VISIT (OUTPATIENT)
Dept: ENDOCRINOLOGY CLINIC | Facility: CLINIC | Age: 29
End: 2024-11-18

## 2024-11-18 VITALS
HEART RATE: 120 BPM | DIASTOLIC BLOOD PRESSURE: 77 MMHG | WEIGHT: 132 LBS | SYSTOLIC BLOOD PRESSURE: 127 MMHG | BODY MASS INDEX: 26 KG/M2

## 2024-11-18 DIAGNOSIS — E10.9 CONTROLLED DIABETES MELLITUS TYPE 1 WITHOUT COMPLICATIONS (HCC): Primary | ICD-10-CM

## 2024-11-18 LAB
GLUCOSE BLOOD: 135
HEMOGLOBIN A1C: 6.4 % (ref 4.3–5.6)
TEST STRIP LOT #: NORMAL NUMERIC

## 2024-11-18 PROCEDURE — 99214 OFFICE O/P EST MOD 30 MIN: CPT | Performed by: INTERNAL MEDICINE

## 2024-11-18 PROCEDURE — 82947 ASSAY GLUCOSE BLOOD QUANT: CPT | Performed by: INTERNAL MEDICINE

## 2024-11-18 PROCEDURE — 3044F HG A1C LEVEL LT 7.0%: CPT | Performed by: INTERNAL MEDICINE

## 2024-11-18 PROCEDURE — 83036 HEMOGLOBIN GLYCOSYLATED A1C: CPT | Performed by: INTERNAL MEDICINE

## 2024-11-18 PROCEDURE — 3074F SYST BP LT 130 MM HG: CPT | Performed by: INTERNAL MEDICINE

## 2024-11-18 PROCEDURE — 3078F DIAST BP <80 MM HG: CPT | Performed by: INTERNAL MEDICINE

## 2024-11-18 PROCEDURE — 3061F NEG MICROALBUMINURIA REV: CPT | Performed by: INTERNAL MEDICINE

## 2024-11-18 RX ORDER — PEN NEEDLE, DIABETIC 32GX 5/32"
NEEDLE, DISPOSABLE MISCELLANEOUS
Qty: 100 EACH | Refills: 3 | Status: SHIPPED | OUTPATIENT
Start: 2024-11-18

## 2024-11-18 RX ORDER — GLUCAGON INJECTION, SOLUTION 1 MG/.2ML
1 INJECTION, SOLUTION SUBCUTANEOUS AS NEEDED
Qty: 1 EACH | Refills: 1 | Status: SHIPPED | OUTPATIENT
Start: 2024-11-18

## 2024-11-18 RX ORDER — INSULIN DEGLUDEC 100 U/ML
24 INJECTION, SOLUTION SUBCUTANEOUS DAILY
Qty: 18 ML | Refills: 1 | Status: SHIPPED | OUTPATIENT
Start: 2024-11-18

## 2024-11-18 RX ORDER — ACYCLOVIR 800 MG/1
1 TABLET ORAL
Qty: 6 EACH | Refills: 1 | Status: SHIPPED | OUTPATIENT
Start: 2024-11-18

## 2024-11-18 RX ORDER — INSULIN LISPRO 100 [IU]/ML
INJECTION, SOLUTION INTRAVENOUS; SUBCUTANEOUS
Qty: 45 ML | Refills: 1 | Status: SHIPPED | OUTPATIENT
Start: 2024-11-18

## 2024-11-18 NOTE — PATIENT INSTRUCTIONS
INCREASE Tresiba to 20 units subcutaneous daily     Insulin to carb ratio 1:8    Change Correction 1:40

## 2024-11-18 NOTE — PROGRESS NOTES
Name: Riya Anderson  Date: 11/18/2024    Referring Physician: No ref. provider found    HISTORY OF PRESENT ILLNESS   Riya Anderson is a 29 year old female who presents for diabetes mellitus, diagnosed 8 years ago.  She has tried omnipod in the past but didn't like being attached to pump.      Prior HbA, C or glycohemoglobin were 8.0% 12/2015; 8.4% 4/2016; 7.6% 7/2016; 7.6% 10/2016; 7.2% 3/2017; 7.7% 6/2017; 6.7% 12/2017; 6.9% 3/2018; 6.9% 9/2018; 7.2% 4/2019; 7.2% 2/2020; 8.0% 8/2020; 6.7% 1/2021; 5.6% 8/2021; 6.8% 7/2022; 6.1% 1/2023; 6.7% 7/2023; 6.4% POC Today     Dietary compliance: Fair, guesstimating  Exercise: No  Polyuria/polydipsia: No  Blurred vision: No    Episodes of hypoglycemia: Occ 70-80s  Blood Glucose:  Checking 2-3 times per day  Reviewed BG log book    Medications for DM   Tresiba 18 units SQ daily   Novolog I:CR 1:8  Correction 1:35>140      REVIEW OF SYSTEMS  Eyes: Diabetic retinopathy present: No            Most recent visit to eye doctor in last 12 months: Yes    CV: Cardiovascular disease present: No         Hypertension present: No         Hyperlipidemia present: No         Peripheral Vascular Disease present: No    : Nephropathy present: No    Neuro: Neuropathy present: No    Skin: Infection or ulceration: No    Osteoporosis: No    Thyroid disease: No    Medications:     Current Outpatient Medications:     ALYACEN 1/35 1-35 MG-MCG Oral Tab, TAKE 1 TABLET BY MOUTH EVERY DAY, Disp: 84 tablet, Rfl: 1    insulin degludec (TRESIBA FLEXTOUCH) 100 UNIT/ML Subcutaneous Solution Pen-injector, Inject 20 Units into the skin daily., Disp: 18 mL, Rfl: 1    Insulin Lispro (HUMALOG) 100 UNIT/ML Subcutaneous Solution Cartridge, INJECT 50 UNITS DAILY VIA INSULIN SCALE, Disp: 45 mL, Rfl: 0    Injection Device for Insulin (INPEN 100-BLUE-CONSTANTINO-HUMALOG) Does not apply Device, Use to inject Humalog, Disp: 1 each, Rfl: 0    Glucose Blood (ONETOUCH ULTRA) In Vitro Strip, Use to check blood sugar readings 5  times a day, Disp: 500 each, Rfl: 1    Insulin Pen Needle (BD PEN NEEDLE HAO U/F) 32G X 4 MM Does not apply Misc, USE WITH INSULIN INJECTIONS 4-6 TIMES DAILY AS DIRECTED, Disp: 600 each, Rfl: 3    Injection Device for Insulin (INPEN 100-GREY-TARYN) Does not apply Device, Inject 3 times daily, Disp: 1 each, Rfl: 1    Insulin Syringe-Needle U-100 (BD INSULIN SYRINGE ULTRAFINE) 31G X 5/16\" 0.5 ML Does not apply Misc, Inject 3 times per day, Disp: 50 each, Rfl: 0    Insulin Pen Needle (BD PEN NEEDLE HAO U/F) 32G X 4 MM Does not apply Misc, Inject once daily, Disp: 100 each, Rfl: 3    ondansetron (ZOFRAN) 8 MG tablet, Take 1 tablet (8 mg total) by mouth every 8 (eight) hours as needed for Nausea., Disp: 10 tablet, Rfl: 0    Glucose Blood (ACCU-CHEK GUIDE) In Vitro Strip, Use as directed to check blood glucose 5 times per day, Disp: 500 strip, Rfl: 1    glucagon (GVOKE HYPOPEN 2-PACK) 1 MG/0.2ML Subcutaneous SUBQ injection, Inject 0.2 mL (1 mg total) into the skin as needed., Disp: 1 each, Rfl: 1    Blood Glucose Monitoring Suppl (ACCU-CHEK GUIDE) w/Device Does not apply Kit, Use as directed to check blood glucose 5 times per day, Disp: 1 kit, Rfl: 0    Lancets Does not apply Misc, Use as directed to check blood glucose 5 times per day, Disp: 500 each, Rfl: 1    Glucose Blood (CONTOUR NEXT TEST) In Vitro Strip, TEST 5 TIMES DAILY, Disp: 500 strip, Rfl: 1    Blood Glucose Monitoring Suppl (NATALIA CONTOUR MONITOR) W/DEVICE Does not apply Kit, Use to check blood sugar as directed, Disp: 1 kit, Rfl: 0    ONETOUCH ULTRA BLUE In Vitro Strip, TEST 5 TIMES DAILY, Disp: 150 strip, Rfl: 5    Insulin Syringe 31G X 5/16\" 0.3 ML Does not apply Misc, , Disp: , Rfl: 0    insulin aspart (NOVOLOG PENFILL) 100 UNIT/ML Subcutaneous Solution Cartridge, INJECT 50 UNITS DAILY VIA INSULIN SCALE (Patient not taking: Reported on 11/18/2024), Disp: 45 mL, Rfl: 1     Allergies:   No Known Allergies    Social History:   Social History      Socioeconomic History    Marital status: Single   Tobacco Use    Smoking status: Never    Smokeless tobacco: Never   Substance and Sexual Activity    Alcohol use: No     Alcohol/week: 0.0 standard drinks of alcohol    Drug use: No       Medical History:   Past Medical History:    Type 1 diabetes mellitus (HCC)       Surgical history:   No past surgical history on file.      PHYSICAL EXAM  /77   Pulse 120   Wt 132 lb (59.9 kg)   BMI 25.78 kg/m²     General Appearance:  alert, well developed, in no acute distress  Eyes:  normal conjunctivae, sclera., normal sclera and normal pupils  Ears/Nose/Mouth/Throat/Neck:  no palpable thyroid nodules or cervical lymphadenopathy  Back: no kyphosis or back tenderness  Respiratory:  clear to auscultation bilaterally  Musculoskeletal:  normal muscle strength and tone  Skin:  normal moisture and skin texture  Hair & Nails:  normal scalp hair     Hematologic:  no excessive bruising  Neuro:  sensory grossly intact and motor grossly intact  Psychiatric:  oriented to time, self, and place  Nutritional:  no abnormal weight gain or loss  Bilateral barefoot skin diabetic exam is normal, visualized feet and the appearance is normal.  Bilateral monofilament/sensation of both feet is normal.  Pulsation pedal pulse exam of both lower legs/feet is normal as well.    ASSESSMENT/PLAN:      1. Diabetes Mellitus Type 1, controlled  -controlled, hgA1c 6.4% -->at goal   -Congratulated patient on improved glycemic control   -Discussed importance of glycemic control to prevent complications of diabetes  -Discussed complications of diabetes include retinopathy, neuropathy, nephropathy and cardiovascular disease  -Discussed importance of SBGM  -Discussed importance of CHO counting  -She is not interested in pump or CGM at this time   -Increase Tresiba to 20 units SQ QHS  -Continue I:CR to 1:8  -Change CF to 1:40   -Normotensive  -Normal lipids  -Normal renal function  -Normal foot exam  performed today    -Labs are at goal   -Start Susan CGM during OV     2. Subclinical Hypothyroidism  -Normalized TFTs     A total of 30 minutes was spent on obtaining history, reviewing pertinent labs, evaluating patient, providing multiple treatment options, reinforcing diet/exercise and compliance, and completing documentation.     RTC 6 months    11/18/2024  Anamika Curran MD

## 2024-11-18 NOTE — PROGRESS NOTES
Freestyle susan 3 sample provided to the patient at the office visit today. Susan sensor placed on the left upper arm. Patient tolerated well. Patient downloaded freestyle susan 3 trenton. Sensor started with Freestyle susan 3 trenton. Alarm settings adjusted to alert patient for any blood sugars <70 or >350. Patient connected to provider protal via trenton with code [id number].      Susan sensor Data:  Style: susan sensor 3  Lot: [id number]  Exp: [id number]     Patient understands sensor is waterproof but try to limit contact with water to no more than 30 minutes at a time. May purchase over patches to assist with sticking to skin. Patient understands sensor needs to be changed every 14 days.     Patient advised to always check blood sugar with finger stick capillary blood sample if she is every feeling symptoms of hyperglycemia or hypoglycemia. If she feels her sensor is giving inaccurate readings may contact company customer service for replacement sensor.     Total of 15 minutes spent educating the patient and placing sensor.

## 2024-11-20 ENCOUNTER — TELEPHONE (OUTPATIENT)
Dept: ENDOCRINOLOGY CLINIC | Facility: CLINIC | Age: 29
End: 2024-11-20

## 2024-11-20 DIAGNOSIS — E10.9 CONTROLLED DIABETES MELLITUS TYPE 1 WITHOUT COMPLICATIONS (HCC): Primary | ICD-10-CM

## 2024-11-20 RX ORDER — INSULIN DEGLUDEC 100 U/ML
20 INJECTION, SOLUTION SUBCUTANEOUS DAILY
Qty: 15 ML | Refills: 0 | Status: SHIPPED | OUTPATIENT
Start: 2024-11-20

## 2024-11-20 NOTE — TELEPHONE ENCOUNTER
Patient called to request a refill for the TRESIBA. Patient states she completely ran out of her medication. Please send the medication to the [facility] that's located in the [employer] in [place].   Patient is requesting for a nurse to call her first before send the script over.       insulin degludec (TRESIBA FLEXTOUCH) 100 UNIT/ML Subcutaneous Solution Pen-injector, Inject 24 Units into the skin daily., Disp: 18 mL, Rfl: 1  
Spoke to patient - she is almost out of tresiba - RX sent 11/18/24 to [facility] will not arrived in time - patient spoke to her insurance and was told they will cover one box at local pharmacy - patient reuqesting [facility] to [facility] in [facility] on [address]   RX sent for 15ml  
no

## 2024-12-06 RX ORDER — INSULIN LISPRO 100 [IU]/ML
INJECTION, SOLUTION INTRAVENOUS; SUBCUTANEOUS
Qty: 45 ML | Refills: 1 | Status: SHIPPED | OUTPATIENT
Start: 2024-12-06

## 2024-12-06 NOTE — TELEPHONE ENCOUNTER
Endocrine refill protocol for rapid acting, regular, intermediate, and mixed insulin:    Protocol Criteria:  PASSED Reason: N/A    If all below requirements are met, send a 90-day supply with 1 refill per provider protocol.    Verify appointment with Endocrinology completed in the last 6 months or scheduled in the next 3 months.  Verify A1C has been completed within the last 6 months and is below 8.5%    -May substitute prescriptions for Novolog and Humalog unless documented allergy (pens and vials) at the same dose and concentration per insurance preference and provider protocol.   -May substitute prescriptions for Novolin R and Humulin R unless documented allergy (pens and vials) at the same dose and concentration per insurance preference and provider protocol.   -May substitute prescriptions for Novolin N and Humulin N unless documented allergy (pens and vials) at the same dose and concentration per insurance preference and provider protocol.   -May substitute prescriptions for Humulin and Novolin 70/30 insulin unless documented allergy at the same dose and concentration per insurance preference and provider protocol.    Last completed office visit: 11/18/2024 Anamika Curran MD   Next scheduled Follow up:   Future Appointments   Date Time Provider Department Center   5/19/2025  1:45 PM Anamika Curran MD [facility]      Last A1C result: 6.4% done 11/18/2024.

## 2025-02-16 DIAGNOSIS — E10.9 CONTROLLED DIABETES MELLITUS TYPE 1 WITHOUT COMPLICATIONS (HCC): ICD-10-CM

## 2025-02-17 RX ORDER — INSULIN DEGLUDEC 100 U/ML
20 INJECTION, SOLUTION SUBCUTANEOUS DAILY
Qty: 15 ML | Refills: 1 | Status: SHIPPED | OUTPATIENT
Start: 2025-02-17

## 2025-02-17 NOTE — TELEPHONE ENCOUNTER
Endocrine refill protocol for basal insulins     Protocol Criteria: PASSED Reason: N/A    If all below requirements are met, send a 90-day supply with 1 refill per provider protocol.       Verify Appointment with Endocrinology completed in the last 6 months or scheduled in the next 3 months.  Verify A1C has been completed within the last 6 months and is below 8.5%     Last completed office visit:11/18/2024 Anamika Curran MD   Next scheduled Follow up:   Future Appointments   Date Time Provider Department Center   5/19/2025  1:45 PM Anamika Curran MD [facility]      Last A1c result: Last A1c value was 6.4% done 11/18/2024.

## 2025-02-18 ENCOUNTER — TELEPHONE (OUTPATIENT)
Dept: ENDOCRINOLOGY CLINIC | Facility: CLINIC | Age: 30
End: 2025-02-18

## 2025-02-18 NOTE — TELEPHONE ENCOUNTER
Received a fax of patients most recent eye exam dated on 02/13/2025 with John Renee OD at [facility]. Eye exam was documented in patient's 'Diabetes Flowsheet' and placed in providers folder for review.

## 2025-04-12 RX ORDER — PEN NEEDLE, DIABETIC 32GX 5/32"
NEEDLE, DISPOSABLE MISCELLANEOUS
Qty: 600 EACH | Refills: 1 | Status: SHIPPED | OUTPATIENT
Start: 2025-04-12

## 2025-04-12 NOTE — TELEPHONE ENCOUNTER
Endocrine Refill protocol for Glucose testing supplies     Protocol Criteria: PASSED     If below requirement is met, send a 90-day supply with 1 refill per provider protocol.    Verify appointment with Endocrinology completed in the last 6 months or scheduled in the next 3 months.    Last completed office visit: 11/18/2024 Anamika Curran MD   Next scheduled Follow up:   Future Appointments   Date Time Provider Department Center   5/19/2025  1:45 PM Anamika Curran MD [facility]

## 2025-05-14 ENCOUNTER — PATIENT MESSAGE (OUTPATIENT)
Dept: ENDOCRINOLOGY CLINIC | Facility: CLINIC | Age: 30
End: 2025-05-14

## 2025-05-14 DIAGNOSIS — E03.8 SUBCLINICAL HYPOTHYROIDISM: ICD-10-CM

## 2025-05-14 DIAGNOSIS — E10.9 CONTROLLED DIABETES MELLITUS TYPE 1 WITHOUT COMPLICATIONS (HCC): Primary | ICD-10-CM

## 2025-05-14 NOTE — TELEPHONE ENCOUNTER
Dr. Curran --    Pt has visit on 5/19, does pt need to complete any labs prior?  No active lab orders.     TSH, FT4, microalbumin urine, CMP pended

## 2025-05-18 LAB
ALBUMIN/GLOBULIN RATIO: 1.5 (CALC) (ref 1–2.5)
ALBUMIN: 4.5 G/DL (ref 3.6–5.1)
ALKALINE PHOSPHATASE: 48 U/L (ref 31–125)
ALT: 13 U/L (ref 6–29)
AST: 14 U/L (ref 10–30)
BILIRUBIN, TOTAL: 0.7 MG/DL (ref 0.2–1.2)
BUN: 15 MG/DL (ref 7–25)
CALCIUM: 9.3 MG/DL (ref 8.6–10.2)
CARBON DIOXIDE: 27 MMOL/L (ref 20–32)
CHLORIDE: 105 MMOL/L (ref 98–110)
CHOL/HDLC RATIO: 2.2 (CALC)
CHOLESTEROL, TOTAL: 123 MG/DL
CREATININE, RANDOM URINE: 201 MG/DL (ref 20–275)
CREATININE: 0.62 MG/DL (ref 0.5–0.96)
EGFR: 124 ML/MIN/1.73M2
GLOBULIN: 3 G/DL (CALC) (ref 1.9–3.7)
GLUCOSE: 96 MG/DL (ref 65–99)
HDL CHOLESTEROL: 57 MG/DL
LDL-CHOLESTEROL: 52 MG/DL (CALC)
MICROALBUMIN/CREATININE RATIO, RANDOM URINE: 3 MG/G CREAT
MICROALBUMIN: 0.7 MG/DL
NON-HDL CHOLESTEROL: 66 MG/DL (CALC)
POTASSIUM: 3.9 MMOL/L (ref 3.5–5.3)
PROTEIN, TOTAL: 7.5 G/DL (ref 6.1–8.1)
SODIUM: 139 MMOL/L (ref 135–146)
T4, FREE: 1 NG/DL (ref 0.8–1.8)
TRIGLYCERIDES: 62 MG/DL
TSH: 2.25 MIU/L

## 2025-05-19 ENCOUNTER — OFFICE VISIT (OUTPATIENT)
Dept: ENDOCRINOLOGY CLINIC | Facility: CLINIC | Age: 30
End: 2025-05-19
Payer: COMMERCIAL

## 2025-05-19 ENCOUNTER — NURSE ONLY (OUTPATIENT)
Dept: ENDOCRINOLOGY CLINIC | Facility: CLINIC | Age: 30
End: 2025-05-19

## 2025-05-19 VITALS
DIASTOLIC BLOOD PRESSURE: 71 MMHG | SYSTOLIC BLOOD PRESSURE: 110 MMHG | WEIGHT: 130 LBS | HEART RATE: 84 BPM | BODY MASS INDEX: 25 KG/M2

## 2025-05-19 DIAGNOSIS — E10.9 CONTROLLED DIABETES MELLITUS TYPE 1 WITHOUT COMPLICATIONS (HCC): Primary | ICD-10-CM

## 2025-05-19 LAB
GLUCOSE BLOOD: 75
HEMOGLOBIN A1C: 6.3 % (ref 4.3–5.6)
TEST STRIP LOT #: NORMAL NUMERIC

## 2025-05-19 RX ORDER — HYDROCHLOROTHIAZIDE 12.5 MG/1
1 CAPSULE ORAL DAILY
Qty: 6 EACH | Refills: 1 | Status: SHIPPED | OUTPATIENT
Start: 2025-05-19

## 2025-05-19 RX ORDER — NORETHINDRONE ACETATE AND ETHINYL ESTRADIOL .02; 1 MG/1; MG/1
1 TABLET ORAL DAILY
Qty: 21 TABLET | Refills: 12 | Status: SHIPPED | OUTPATIENT
Start: 2025-05-19 | End: 2026-05-19

## 2025-05-19 NOTE — PROGRESS NOTES
Susan 3 plus Start    Riya Anderson  3/23/1995      Lab Results   Component Value Date    A1C 6.3 (A) 05/19/2025    A1C 6.4 (A) 11/18/2024    A1C 6.7 (A) 07/28/2023    A1C 6.1 (A) 01/27/2023    A1C 6.8 (A) 07/29/2022           Patient seen in clinic to start on new Susan device.  Patient given verbal instructions and demonstrated and completed teach back in the correct order before placing first sensor on back of  the left arm.    Reviewed the following topics:    Functions of the sensor, phone/reader, and overlap patches  Reviewed how to scan and warm up period of 1hr if using Susan 2  Test blood glucose if symptoms do not match sensor reading.  How to handle problems like MRI, CT scans, travel, etc.  Explained how to change enter sensor code every 14/15 days  Showed Riya Anderson how to change high/low alert as well as to see when sensor expires.  Reviewed how to remove sensor in 15 days.  Reviewed sensor expiration alerts.  Instructed patient they will have to return to fingerstick blood glucose testing temporarily if out of sensors/reader  Reviewed packing/supplies  Confirmed patient is getting supplies from [facility] pharmacy   contact information given     Phone:  - Downloaded Susan trenton on phone  - Invited/Connected to [facility]    Care Gaps         Follow Up: 6 months with Dr. Curran      5/19/2025  Frida HERNÁNDEZN RN [facility]  Diabetes Care &      A total of 20 minutes was spent with the patient including chart review, discussion and education / advisement pertinent to patient and provider specified concerns as documented above.     Note to patient: The 21 Century Cures Act makes medical notes like these available to patients in the interest of transparency. However, be advised this is a medical document. It is intended as peer to peer communication. It is written in medical language and may contain abbreviations or verbiage that are unfamiliar. It may appear blunt or direct.  Medical documents are intended to carry relevant information, facts as evident, and the clinical opinion of the practitioner.

## 2025-05-19 NOTE — PROGRESS NOTES
Name: Riya Anderson  Date: 5/19/2025    Referring Physician: No ref. provider found    HISTORY OF PRESENT ILLNESS   Riya Anderson is a 30 year old female who presents for diabetes mellitus, diagnosed 8 years ago.  She has tried omnipod in the past but didn't like being attached to pump.      Prior HbA, C or glycohemoglobin were 8.0% 12/2015; 8.4% 4/2016; 7.6% 7/2016; 7.6% 10/2016; 7.2% 3/2017; 7.7% 6/2017; 6.7% 12/2017; 6.9% 3/2018; 6.9% 9/2018; 7.2% 4/2019; 7.2% 2/2020; 8.0% 8/2020; 6.7% 1/2021; 5.6% 8/2021; 6.8% 7/2022; 6.1% 1/2023; 6.7% 7/2023; 6.4% 11/2024; 6.3% POC Today     Dietary compliance: Fair, guesstimating  Exercise: No  Polyuria/polydipsia: No  Blurred vision: No    Episodes of hypoglycemia: Occ 70-80s  Blood Glucose:  Checking 2-3 times per day  Reviewed BG log book    Medications for DM   Tresiba 18 units SQ daily   Novolog I:CR 1:8  Correction 1:35>140      REVIEW OF SYSTEMS  Eyes: Diabetic retinopathy present: No            Most recent visit to eye doctor in last 12 months: Yes    CV: Cardiovascular disease present: No         Hypertension present: No         Hyperlipidemia present: No         Peripheral Vascular Disease present: No    : Nephropathy present: No    Neuro: Neuropathy present: No    Skin: Infection or ulceration: No    Osteoporosis: No    Thyroid disease: No    Medications:     Current Outpatient Medications:     Insulin Pen Needle (BD PEN NEEDLE HAO 2ND GEN) 32G X 4 MM Does not apply Misc, USE WITH INSULIN INJECTIONS 4-6 TIMES DAILY AS DIRECTED, Disp: 600 each, Rfl: 1    insulin degludec (TRESIBA FLEXTOUCH) 100 UNIT/ML Subcutaneous Solution Pen-injector, INJECT 20 UNITS INTO THE SKIN DAILY, Disp: 15 mL, Rfl: 1    Insulin Lispro (HUMALOG) 100 UNIT/ML Subcutaneous Solution Cartridge, INJECT 50 UNITS DAILY VIA INSULIN SCALE, Disp: 45 mL, Rfl: 1    glucagon (GVOKE HYPOPEN 2-PACK) 1 MG/0.2ML Subcutaneous injection, Inject 0.2 mL (1 mg total) into the skin as needed., Disp: 1 each, Rfl:  1    Injection Device for Insulin (INPEN 100-GREY-TARYN) Does not apply Device, Inject 3 times daily, Disp: 1 each, Rfl: 1    Insulin Pen Needle (BD PEN NEEDLE HAO U/F) 32G X 4 MM Does not apply Misc, Inject once daily, Disp: 100 each, Rfl: 3    ALYACEN 1/35 1-35 MG-MCG Oral Tab, TAKE 1 TABLET BY MOUTH EVERY DAY, Disp: 84 tablet, Rfl: 1    Injection Device for Insulin (INPEN 100-BLUE-CONSTANTINO-HUMALOG) Does not apply Device, Use to inject Humalog, Disp: 1 each, Rfl: 0    Glucose Blood (ONETOUCH ULTRA) In Vitro Strip, Use to check blood sugar readings 5 times a day, Disp: 500 each, Rfl: 1    Insulin Pen Needle (BD PEN NEEDLE HAO U/F) 32G X 4 MM Does not apply Misc, USE WITH INSULIN INJECTIONS 4-6 TIMES DAILY AS DIRECTED, Disp: 600 each, Rfl: 3    Insulin Syringe-Needle U-100 (BD INSULIN SYRINGE ULTRAFINE) 31G X 5/16\" 0.5 ML Does not apply Misc, Inject 3 times per day, Disp: 50 each, Rfl: 0    ondansetron (ZOFRAN) 8 MG tablet, Take 1 tablet (8 mg total) by mouth every 8 (eight) hours as needed for Nausea., Disp: 10 tablet, Rfl: 0    Glucose Blood (ACCU-CHEK GUIDE) In Vitro Strip, Use as directed to check blood glucose 5 times per day, Disp: 500 strip, Rfl: 1    Blood Glucose Monitoring Suppl (ACCU-CHEK GUIDE) w/Device Does not apply Kit, Use as directed to check blood glucose 5 times per day, Disp: 1 kit, Rfl: 0    Lancets Does not apply Misc, Use as directed to check blood glucose 5 times per day, Disp: 500 each, Rfl: 1    Glucose Blood (CONTOUR NEXT TEST) In Vitro Strip, TEST 5 TIMES DAILY, Disp: 500 strip, Rfl: 1    Blood Glucose Monitoring Suppl (NATALIA CONTOUR MONITOR) W/DEVICE Does not apply Kit, Use to check blood sugar as directed, Disp: 1 kit, Rfl: 0    ONETOUCH ULTRA BLUE In Vitro Strip, TEST 5 TIMES DAILY, Disp: 150 strip, Rfl: 5    Insulin Syringe 31G X 5/16\" 0.3 ML Does not apply Misc, , Disp: , Rfl: 0    Continuous Glucose Sensor (FREESTYLE LUCHO 3 SENSOR) Does not apply Misc, 1 Device every 14 (fourteen)  days. (Patient not taking: Reported on 5/19/2025), Disp: 6 each, Rfl: 1    insulin aspart (NOVOLOG PENFILL) 100 UNIT/ML Subcutaneous Solution Cartridge, INJECT 50 UNITS DAILY VIA INSULIN SCALE (Patient not taking: Reported on 5/19/2025), Disp: 45 mL, Rfl: 1     Allergies:   No Known Allergies    Social History:   Social History     Socioeconomic History    Marital status: Single   Tobacco Use    Smoking status: Never    Smokeless tobacco: Never   Substance and Sexual Activity    Alcohol use: No     Alcohol/week: 0.0 standard drinks of alcohol    Drug use: No       Medical History:   Past Medical History:    Type 1 diabetes mellitus (HCC)       Surgical history:   No past surgical history on file.      PHYSICAL EXAM  /71   Pulse 84   Wt 130 lb (59 kg)   BMI 25.39 kg/m²     General Appearance:  alert, well developed, in no acute distress  Eyes:  normal conjunctivae, sclera., normal sclera and normal pupils  Ears/Nose/Mouth/Throat/Neck:  no palpable thyroid nodules or cervical lymphadenopathy  Back: no kyphosis or back tenderness  Respiratory:  clear to auscultation bilaterally  Musculoskeletal:  normal muscle strength and tone  Skin:  normal moisture and skin texture  Hair & Nails:  normal scalp hair     Hematologic:  no excessive bruising  Neuro:  sensory grossly intact and motor grossly intact  Psychiatric:  oriented to time, self, and place  Nutritional:  no abnormal weight gain or loss    ASSESSMENT/PLAN:      1. Diabetes Mellitus Type 1, controlled  -controlled, hgA1c 6.4% -->at goal   -Congratulated patient on improved glycemic control   -Discussed importance of glycemic control to prevent complications of diabetes  -Discussed complications of diabetes include retinopathy, neuropathy, nephropathy and cardiovascular disease  -Discussed importance of SBGM  -Discussed importance of CHO counting  -She is not interested in pump   -She is willing to retry CGM   -Continue Tresiba 18 units SQ QHS  -Change I:CR  to 1:10  -Change CF to 1:40   -Normotensive  -Normal lipids  -Normal renal function  -Normal foot exam performed 11/2025    -Labs are at goal 5/2025   -Start Susan CGM during OV     2. Subclinical Hypothyroidism  -Normalized TFTs     A total of 30 minutes was spent on obtaining history, reviewing pertinent labs, evaluating patient, providing multiple treatment options, reinforcing diet/exercise and compliance, and completing documentation.     RTC 6 months    5/19/2025  Anamika Curran MD

## 2025-08-02 DIAGNOSIS — E10.9 CONTROLLED DIABETES MELLITUS TYPE 1 WITHOUT COMPLICATIONS (HCC): ICD-10-CM

## 2025-08-02 RX ORDER — INSULIN DEGLUDEC 100 U/ML
18 INJECTION, SOLUTION SUBCUTANEOUS DAILY
Qty: 15 ML | Refills: 1 | Status: SHIPPED | OUTPATIENT
Start: 2025-08-02

## (undated) NOTE — LETTER
3/14/2019              [address]         [place] [address]         Dear Albino Jimenez,    [facility] records indicate that the blood tests ordered for you by Cheryl Pringle MD  have not been done.   If you have, in fact, already c

## (undated) NOTE — MR AVS SNAPSHOT
[facility]  [address] [place] [address] [phone]               Thank you for choosing us for your health care visit with Aaron Sloan MD.  We are glad to serve you and happy to provide you with this summary of your visit.   Ple Insulin Degludec 100 UNIT/ML Sopn   Inject 28 Units into the skin daily.    Commonly known as:  TRESIBA FLEXTOUCH           Insulin Pen Needle 32G X 4 MM Misc   Inject once daily   Commonly known as:  BD PEN NEEDLE HAO U/F           Insulin Syringe 31G X visit,  view other health information, and more. To sign up or find more information, go [email or web address] and click on the Sign Up Now link in the [employer] box.      Enter your [employer] Activation Code exactly as it appears below along with yo

## (undated) NOTE — MR AVS SNAPSHOT
[address] [facility]  [address] [place] [place] [address]  [phone]               Thank you for choosing us for your health care visit with Vimal Gomez MD.  We are glad to serve you and happy to provide you with this summary of your visit.   Ple * LEVEMIR 100 UNIT/ML Soln   Generic drug:  insulin detemir   23 units in the morning and 21 units at night. NECON 1/35 (28) 1-35 MG-MCG Tabs   Generic drug:  Norethindrone-Eth Estradiol   Take 1 tablet by mouth daily.            * ONETOUCH ULTRA